# Patient Record
Sex: FEMALE | Race: WHITE | Employment: OTHER | URBAN - NONMETROPOLITAN AREA
[De-identification: names, ages, dates, MRNs, and addresses within clinical notes are randomized per-mention and may not be internally consistent; named-entity substitution may affect disease eponyms.]

---

## 2018-03-12 ENCOUNTER — APPOINTMENT (OUTPATIENT)
Dept: CT IMAGING | Facility: HOSPITAL | Age: 78
DRG: 809 | End: 2018-03-12
Attending: FAMILY MEDICINE
Payer: MEDICARE

## 2018-03-12 ENCOUNTER — HOSPITAL ENCOUNTER (INPATIENT)
Facility: HOSPITAL | Age: 78
LOS: 2 days | Discharge: HOME | DRG: 809 | End: 2018-03-14
Attending: FAMILY MEDICINE | Admitting: INTERNAL MEDICINE
Payer: MEDICARE

## 2018-03-12 DIAGNOSIS — S01.01XA LACERATION OF SCALP, INITIAL ENCOUNTER: ICD-10-CM

## 2018-03-12 DIAGNOSIS — D64.9 ANEMIA: ICD-10-CM

## 2018-03-12 DIAGNOSIS — D50.0 IRON DEFICIENCY ANEMIA DUE TO CHRONIC BLOOD LOSS: ICD-10-CM

## 2018-03-12 DIAGNOSIS — N30.00 ACUTE CYSTITIS WITHOUT HEMATURIA: Primary | ICD-10-CM
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
## 2018-03-12 PROBLEM — F32.A DEPRESSION: Status: ACTIVE | Noted: 2018-03-12

## 2018-03-12 PROBLEM — R55 SYNCOPE, UNSPECIFIED SYNCOPE TYPE: Status: ACTIVE | Noted: 2018-03-12

## 2018-03-12 PROBLEM — E87.6 HYPOKALEMIA: Status: ACTIVE | Noted: 2018-03-12

## 2018-03-12 PROBLEM — D61.818 PANCYTOPENIA (H): Status: ACTIVE | Noted: 2018-03-12

## 2018-03-12 PROBLEM — M19.90 OSTEOARTHRITIS: Status: ACTIVE | Noted: 2018-03-12

## 2018-03-12 LAB
ALBUMIN SERPL-MCNC: 3.6 G/DL (ref 3.4–5)
ALBUMIN UR-MCNC: NEGATIVE MG/DL
ALP SERPL-CCNC: 150 U/L (ref 40–150)
ALT SERPL W P-5'-P-CCNC: 26 U/L (ref 0–50)
ANION GAP SERPL CALCULATED.3IONS-SCNC: 9 MMOL/L (ref 3–14)
APPEARANCE UR: ABNORMAL
AST SERPL W P-5'-P-CCNC: 35 U/L (ref 0–45)
BACTERIA #/AREA URNS HPF: ABNORMAL /HPF
BASOPHILS # BLD AUTO: 0 10E9/L (ref 0–0.2)
BASOPHILS # BLD AUTO: 0 10E9/L (ref 0–0.2)
BASOPHILS NFR BLD AUTO: 0.4 %
BASOPHILS NFR BLD AUTO: 0.5 %
BILIRUB SERPL-MCNC: 0.7 MG/DL (ref 0.2–1.3)
BILIRUB UR QL STRIP: NEGATIVE
BLD PROD TYP BPU: NORMAL
BLD UNIT ID BPU: 0
BLOOD PRODUCT CODE: NORMAL
BPU ID: NORMAL
BUN SERPL-MCNC: 13 MG/DL (ref 7–30)
CALCIUM SERPL-MCNC: 8.2 MG/DL (ref 8.5–10.1)
CHLORIDE SERPL-SCNC: 107 MMOL/L (ref 94–109)
CO2 SERPL-SCNC: 25 MMOL/L (ref 20–32)
COLOR UR AUTO: YELLOW
CREAT SERPL-MCNC: 0.88 MG/DL (ref 0.52–1.04)
DIFFERENTIAL METHOD BLD: ABNORMAL
DIFFERENTIAL METHOD BLD: ABNORMAL
ELLIPTOCYTES BLD QL SMEAR: ABNORMAL
EOSINOPHIL # BLD AUTO: 0 10E9/L (ref 0–0.7)
EOSINOPHIL # BLD AUTO: 0.1 10E9/L (ref 0–0.7)
EOSINOPHIL NFR BLD AUTO: 1.4 %
EOSINOPHIL NFR BLD AUTO: 2.3 %
ERYTHROCYTE [DISTWIDTH] IN BLOOD BY AUTOMATED COUNT: 20 % (ref 10–15)
ERYTHROCYTE [DISTWIDTH] IN BLOOD BY AUTOMATED COUNT: 20.1 % (ref 10–15)
FERRITIN SERPL-MCNC: 5 NG/ML (ref 8–252)
GFR SERPL CREATININE-BSD FRML MDRD: 63 ML/MIN/1.7M2
GLUCOSE SERPL-MCNC: 119 MG/DL (ref 70–99)
GLUCOSE UR STRIP-MCNC: NEGATIVE MG/DL
HCT VFR BLD AUTO: 23.5 % (ref 35–47)
HCT VFR BLD AUTO: 25 % (ref 35–47)
HEMOCCULT SP1 STL QL: NEGATIVE
HGB BLD-MCNC: 6.5 G/DL (ref 11.7–15.7)
HGB BLD-MCNC: 7 G/DL (ref 11.7–15.7)
HGB UR QL STRIP: NEGATIVE
IMM GRANULOCYTES # BLD: 0 10E9/L (ref 0–0.4)
IMM GRANULOCYTES # BLD: 0 10E9/L (ref 0–0.4)
IMM GRANULOCYTES NFR BLD: 0.4 %
IMM GRANULOCYTES NFR BLD: 0.5 %
INR PPP: 1.12 (ref 0.8–1.2)
IRON SATN MFR SERPL: 2 % (ref 15–46)
IRON SERPL-MCNC: 14 UG/DL (ref 35–180)
KETONES UR STRIP-MCNC: NEGATIVE MG/DL
LDH SERPL L TO P-CCNC: 191 U/L (ref 81–234)
LEUKOCYTE ESTERASE UR QL STRIP: ABNORMAL
LYMPHOCYTES # BLD AUTO: 0.7 10E9/L (ref 0.8–5.3)
LYMPHOCYTES # BLD AUTO: 0.8 10E9/L (ref 0.8–5.3)
LYMPHOCYTES NFR BLD AUTO: 31.3 %
LYMPHOCYTES NFR BLD AUTO: 31.8 %
MCH RBC QN AUTO: 17.5 PG (ref 26.5–33)
MCH RBC QN AUTO: 17.7 PG (ref 26.5–33)
MCHC RBC AUTO-ENTMCNC: 27.7 G/DL (ref 31.5–36.5)
MCHC RBC AUTO-ENTMCNC: 28 G/DL (ref 31.5–36.5)
MCV RBC AUTO: 63 FL (ref 78–100)
MCV RBC AUTO: 63 FL (ref 78–100)
MONOCYTES # BLD AUTO: 0.2 10E9/L (ref 0–1.3)
MONOCYTES # BLD AUTO: 0.2 10E9/L (ref 0–1.3)
MONOCYTES NFR BLD AUTO: 6.9 %
MONOCYTES NFR BLD AUTO: 8 %
NEUTROPHILS # BLD AUTO: 1.3 10E9/L (ref 1.6–8.3)
NEUTROPHILS # BLD AUTO: 1.5 10E9/L (ref 1.6–8.3)
NEUTROPHILS NFR BLD AUTO: 57.1 %
NEUTROPHILS NFR BLD AUTO: 59.4 %
NITRATE UR QL: POSITIVE
NRBC # BLD AUTO: 0 10*3/UL
NRBC # BLD AUTO: 0 10*3/UL
NRBC BLD AUTO-RTO: 0 /100
NRBC BLD AUTO-RTO: 0 /100
PH UR STRIP: 7 PH (ref 4.7–8)
PLATELET # BLD AUTO: 110 10E9/L (ref 150–450)
PLATELET # BLD AUTO: 96 10E9/L (ref 150–450)
POTASSIUM SERPL-SCNC: 3.2 MMOL/L (ref 3.4–5.3)
PROT SERPL-MCNC: 7.4 G/DL (ref 6.8–8.8)
RBC # BLD AUTO: 3.71 10E12/L (ref 3.8–5.2)
RBC # BLD AUTO: 3.95 10E12/L (ref 3.8–5.2)
RBC #/AREA URNS AUTO: 1 /HPF (ref 0–2)
RETICS # AUTO: 57.1 10E9/L (ref 25–95)
RETICS/RBC NFR AUTO: 1.5 % (ref 0.5–2)
SODIUM SERPL-SCNC: 141 MMOL/L (ref 133–144)
SOURCE: ABNORMAL
SP GR UR STRIP: 1.01 (ref 1–1.03)
TIBC SERPL-MCNC: 592 UG/DL (ref 240–430)
TRANSFUSION STATUS PATIENT QL: NORMAL
TRANSFUSION STATUS PATIENT QL: NORMAL
TROPONIN I SERPL-MCNC: <0.015 UG/L (ref 0–0.04)
TSH SERPL DL<=0.005 MIU/L-ACNC: 1.91 MU/L (ref 0.4–4)
UROBILINOGEN UR STRIP-MCNC: 2 MG/DL (ref 0–2)
WBC # BLD AUTO: 2.2 10E9/L (ref 4–11)
WBC # BLD AUTO: 2.6 10E9/L (ref 4–11)
WBC #/AREA URNS AUTO: >182 /HPF (ref 0–5)

## 2018-03-12 PROCEDURE — 0HQ0XZZ REPAIR SCALP SKIN, EXTERNAL APPROACH: ICD-10-PCS | Performed by: FAMILY MEDICINE

## 2018-03-12 PROCEDURE — 93005 ELECTROCARDIOGRAM TRACING: CPT

## 2018-03-12 PROCEDURE — 40000847 ZZHCL STATISTIC MORPHOLOGY W/INTERP HISTOLOGY TC 85060: Performed by: INTERNAL MEDICINE

## 2018-03-12 PROCEDURE — 36415 COLL VENOUS BLD VENIPUNCTURE: CPT | Performed by: INTERNAL MEDICINE

## 2018-03-12 PROCEDURE — 84484 ASSAY OF TROPONIN QUANT: CPT | Performed by: FAMILY MEDICINE

## 2018-03-12 PROCEDURE — 86901 BLOOD TYPING SEROLOGIC RH(D): CPT | Performed by: INTERNAL MEDICINE

## 2018-03-12 PROCEDURE — 12001 RPR S/N/AX/GEN/TRNK 2.5CM/<: CPT

## 2018-03-12 PROCEDURE — 30233N1 TRANSFUSION OF NONAUTOLOGOUS RED BLOOD CELLS INTO PERIPHERAL VEIN, PERCUTANEOUS APPROACH: ICD-10-PCS | Performed by: INTERNAL MEDICINE

## 2018-03-12 PROCEDURE — 82728 ASSAY OF FERRITIN: CPT | Performed by: INTERNAL MEDICINE

## 2018-03-12 PROCEDURE — 85025 COMPLETE CBC W/AUTO DIFF WBC: CPT | Performed by: INTERNAL MEDICINE

## 2018-03-12 PROCEDURE — 99223 1ST HOSP IP/OBS HIGH 75: CPT | Performed by: INTERNAL MEDICINE

## 2018-03-12 PROCEDURE — 85610 PROTHROMBIN TIME: CPT | Performed by: FAMILY MEDICINE

## 2018-03-12 PROCEDURE — 86922 COMPATIBILITY TEST ANTIGLOB: CPT | Performed by: INTERNAL MEDICINE

## 2018-03-12 PROCEDURE — 25000128 H RX IP 250 OP 636: Performed by: INTERNAL MEDICINE

## 2018-03-12 PROCEDURE — A9270 NON-COVERED ITEM OR SERVICE: HCPCS | Mod: GY | Performed by: INTERNAL MEDICINE

## 2018-03-12 PROCEDURE — 85045 AUTOMATED RETICULOCYTE COUNT: CPT | Performed by: INTERNAL MEDICINE

## 2018-03-12 PROCEDURE — 12001 RPR S/N/AX/GEN/TRNK 2.5CM/<: CPT | Performed by: FAMILY MEDICINE

## 2018-03-12 PROCEDURE — P9016 RBC LEUKOCYTES REDUCED: HCPCS | Performed by: INTERNAL MEDICINE

## 2018-03-12 PROCEDURE — 74177 CT ABD & PELVIS W/CONTRAST: CPT | Mod: TC

## 2018-03-12 PROCEDURE — 82274 ASSAY TEST FOR BLOOD FECAL: CPT | Performed by: FAMILY MEDICINE

## 2018-03-12 PROCEDURE — 99285 EMERGENCY DEPT VISIT HI MDM: CPT | Mod: 25

## 2018-03-12 PROCEDURE — 83540 ASSAY OF IRON: CPT | Performed by: INTERNAL MEDICINE

## 2018-03-12 PROCEDURE — 84443 ASSAY THYROID STIM HORMONE: CPT | Performed by: INTERNAL MEDICINE

## 2018-03-12 PROCEDURE — 93010 ELECTROCARDIOGRAM REPORT: CPT | Performed by: INTERNAL MEDICINE

## 2018-03-12 PROCEDURE — 82746 ASSAY OF FOLIC ACID SERUM: CPT | Performed by: INTERNAL MEDICINE

## 2018-03-12 PROCEDURE — 40000786 ZZHCL STATISTIC ACTIVE MRSA SURVEILLANCE CULTURE: Performed by: INTERNAL MEDICINE

## 2018-03-12 PROCEDURE — 81001 URINALYSIS AUTO W/SCOPE: CPT | Performed by: INTERNAL MEDICINE

## 2018-03-12 PROCEDURE — 83010 ASSAY OF HAPTOGLOBIN QUANT: CPT | Performed by: INTERNAL MEDICINE

## 2018-03-12 PROCEDURE — 83550 IRON BINDING TEST: CPT | Performed by: INTERNAL MEDICINE

## 2018-03-12 PROCEDURE — 83615 LACTATE (LD) (LDH) ENZYME: CPT | Performed by: INTERNAL MEDICINE

## 2018-03-12 PROCEDURE — 12000000 ZZH R&B MED SURG/OB

## 2018-03-12 PROCEDURE — 80053 COMPREHEN METABOLIC PANEL: CPT | Performed by: FAMILY MEDICINE

## 2018-03-12 PROCEDURE — 86900 BLOOD TYPING SEROLOGIC ABO: CPT | Performed by: INTERNAL MEDICINE

## 2018-03-12 PROCEDURE — 82607 VITAMIN B-12: CPT | Performed by: INTERNAL MEDICINE

## 2018-03-12 PROCEDURE — 70450 CT HEAD/BRAIN W/O DYE: CPT | Mod: TC

## 2018-03-12 PROCEDURE — 99285 EMERGENCY DEPT VISIT HI MDM: CPT | Mod: 25 | Performed by: FAMILY MEDICINE

## 2018-03-12 PROCEDURE — 86850 RBC ANTIBODY SCREEN: CPT | Performed by: INTERNAL MEDICINE

## 2018-03-12 PROCEDURE — 36415 COLL VENOUS BLD VENIPUNCTURE: CPT | Performed by: FAMILY MEDICINE

## 2018-03-12 PROCEDURE — 85025 COMPLETE CBC W/AUTO DIFF WBC: CPT | Performed by: FAMILY MEDICINE

## 2018-03-12 PROCEDURE — 25000132 ZZH RX MED GY IP 250 OP 250 PS 637: Mod: GY | Performed by: INTERNAL MEDICINE

## 2018-03-12 RX ORDER — DOCUSATE SODIUM 100 MG/1
100 CAPSULE, LIQUID FILLED ORAL 2 TIMES DAILY
Status: DISCONTINUED | OUTPATIENT
Start: 2018-03-12 | End: 2018-03-14 | Stop reason: HOSPADM

## 2018-03-12 RX ORDER — POTASSIUM CHLORIDE 1500 MG/1
40 TABLET, EXTENDED RELEASE ORAL 2 TIMES DAILY
Status: DISCONTINUED | OUTPATIENT
Start: 2018-03-12 | End: 2018-03-14 | Stop reason: HOSPADM

## 2018-03-12 RX ORDER — IOPAMIDOL 612 MG/ML
100 INJECTION, SOLUTION INTRAVASCULAR ONCE
Status: COMPLETED | OUTPATIENT
Start: 2018-03-12 | End: 2018-03-12

## 2018-03-12 RX ORDER — BISACODYL 5 MG
10 TABLET, DELAYED RELEASE (ENTERIC COATED) ORAL DAILY PRN
Status: DISCONTINUED | OUTPATIENT
Start: 2018-03-12 | End: 2018-03-14 | Stop reason: HOSPADM

## 2018-03-12 RX ORDER — HYDROCODONE BITARTRATE AND ACETAMINOPHEN 5; 325 MG/1; MG/1
1 TABLET ORAL EVERY 6 HOURS PRN
Status: DISCONTINUED | OUTPATIENT
Start: 2018-03-12 | End: 2018-03-14 | Stop reason: HOSPADM

## 2018-03-12 RX ORDER — BISACODYL 5 MG
5 TABLET, DELAYED RELEASE (ENTERIC COATED) ORAL DAILY PRN
Status: DISCONTINUED | OUTPATIENT
Start: 2018-03-12 | End: 2018-03-14 | Stop reason: HOSPADM

## 2018-03-12 RX ORDER — VENLAFAXINE 75 MG/1
75 TABLET ORAL DAILY
Status: DISCONTINUED | OUTPATIENT
Start: 2018-03-12 | End: 2018-03-13

## 2018-03-12 RX ORDER — METHYLPREDNISOLONE SODIUM SUCCINATE 125 MG/2ML
125 INJECTION, POWDER, LYOPHILIZED, FOR SOLUTION INTRAMUSCULAR; INTRAVENOUS ONCE
Status: DISCONTINUED | OUTPATIENT
Start: 2018-03-12 | End: 2018-03-12

## 2018-03-12 RX ORDER — ONDANSETRON 2 MG/ML
4 INJECTION INTRAMUSCULAR; INTRAVENOUS EVERY 6 HOURS PRN
Status: DISCONTINUED | OUTPATIENT
Start: 2018-03-12 | End: 2018-03-14 | Stop reason: HOSPADM

## 2018-03-12 RX ORDER — BISACODYL 5 MG
15 TABLET, DELAYED RELEASE (ENTERIC COATED) ORAL DAILY PRN
Status: DISCONTINUED | OUTPATIENT
Start: 2018-03-12 | End: 2018-03-14 | Stop reason: HOSPADM

## 2018-03-12 RX ORDER — ONDANSETRON 4 MG/1
4 TABLET, ORALLY DISINTEGRATING ORAL EVERY 6 HOURS PRN
Status: DISCONTINUED | OUTPATIENT
Start: 2018-03-12 | End: 2018-03-14 | Stop reason: HOSPADM

## 2018-03-12 RX ORDER — LEVOTHYROXINE SODIUM 50 UG/1
50 TABLET ORAL DAILY
Status: DISCONTINUED | OUTPATIENT
Start: 2018-03-13 | End: 2018-03-14 | Stop reason: HOSPADM

## 2018-03-12 RX ORDER — SIMVASTATIN 20 MG
20 TABLET ORAL AT BEDTIME
Status: DISCONTINUED | OUTPATIENT
Start: 2018-03-12 | End: 2018-03-14 | Stop reason: HOSPADM

## 2018-03-12 RX ORDER — SODIUM CHLORIDE 9 MG/ML
INJECTION, SOLUTION INTRAVENOUS CONTINUOUS
Status: DISCONTINUED | OUTPATIENT
Start: 2018-03-12 | End: 2018-03-13

## 2018-03-12 RX ORDER — NALOXONE HYDROCHLORIDE 0.4 MG/ML
.1-.4 INJECTION, SOLUTION INTRAMUSCULAR; INTRAVENOUS; SUBCUTANEOUS
Status: DISCONTINUED | OUTPATIENT
Start: 2018-03-12 | End: 2018-03-14 | Stop reason: HOSPADM

## 2018-03-12 RX ADMIN — POTASSIUM CHLORIDE 40 MEQ: 20 TABLET, EXTENDED RELEASE ORAL at 22:31

## 2018-03-12 RX ADMIN — IOPAMIDOL 100 ML: 612 INJECTION, SOLUTION INTRAVENOUS at 21:54

## 2018-03-12 RX ADMIN — SIMVASTATIN 20 MG: 20 TABLET, FILM COATED ORAL at 22:31

## 2018-03-12 RX ADMIN — SODIUM CHLORIDE, PRESERVATIVE FREE: 5 INJECTION INTRAVENOUS at 22:22

## 2018-03-12 RX ADMIN — DIATRIZOATE MEGLUMINE AND DIATRIZOATE SODIUM 30 ML: 660; 100 SOLUTION ORAL; RECTAL at 21:54

## 2018-03-12 RX ADMIN — HYDROCODONE BITARTRATE AND ACETAMINOPHEN 1 TABLET: 5; 325 TABLET ORAL at 22:31

## 2018-03-12 ASSESSMENT — VISUAL ACUITY: OU: BASELINE

## 2018-03-12 NOTE — ED NOTES
"States \"Doctor has seen me a little while ago.  In ED for falling off a step stool and hitting the back of my head, denies neck pain, lost consciousness-does not know for how long. \"   and family at bedside.   "

## 2018-03-12 NOTE — ED PROVIDER NOTES
eMERGENCY dEPARTMENT eNCOUnter        CHIEF COMPLAINT    Chief Complaint   Patient presents with     Head Laceration     Fell from the 2nd step on the step stool with LOC.       HPI    Gina Beal is a 77 year old female who presents with fall.  History is obtained from the patient and from her family.  She said she was cleaning her knickknacks at her house.  She lives there with her  he was upstairs but is very hard of hearing.  She remembers adjusting then the next thing she knew she woke up on the floor there was blood on her hand and on the floor she put her she does not know how long she was unconscious.  She walked upstairs from her  he called their daughter who lives a few houses away who picked them up and they all came into the emergency department.  Hand on the back of her head and realized it was bleeding.  We also went through this several times.  Irina is not sure if she tripped and fell from the stool or exactly what happened.  She says she has been feeling well otherwise although was feeling dizzy today.  Specifically she denies any fevers chills cough chest pain.  Positive for   REVIEW OF SYSTEMS    Neurologic: positive  LOC, No hearing loss  Cardiac: No Chest Pain, positive for syncope  Respiratory: No cough or difficulty breathing  GI: No Bloody Stool or Diarrhea  : No Dysuria or Hematuria  General: No Fever  All other systems reviewed and are negative.    PAST MEDICAL & SURGICAL HISTORY    History reviewed. No pertinent past medical history.  History reviewed. No pertinent surgical history.    CURRENT MEDICATIONS    Current Outpatient Rx   Medication Sig Dispense Refill     Furosemide (LASIX PO) Take 40 mg by mouth daily       UNABLE TO FIND 10 mEq daily Takes Potassium Chloride 10 meq by mouth at night       Omeprazole (PRILOSEC PO) Take 20 mg by mouth 2 times daily (before meals)       Venlafaxine HCl (EFFEXOR PO) Take 75 mg by mouth daily       Levothyroxine Sodium  (SYNTHROID PO) Take 50 mcg by mouth daily        Sertraline HCl (ZOLOFT PO) Take by mouth daily       METFORMIN HCL PO Take 500 mg by mouth daily        HYDROcodone-acetaminophen (NORCO) 5-325 MG per tablet Take 1 tablet by mouth every 6 hours as needed       Simvastatin (ZOCOR PO) Take 20 mg by mouth At Bedtime        HYDROCHLOROTHIAZIDE PO Take by mouth daily       LORAZEPAM PO Take by mouth daily         ALLERGIES    Allergies   Allergen Reactions     Aspirin      Flexeril [Cyclobenzaprine]      Iodine      Sulfa Drugs        SOCIAL & FAMILY HISTORY    Social History     Social History     Marital status:      Spouse name: N/A     Number of children: N/A     Years of education: N/A     Social History Main Topics     Smoking status: Not on file     Smokeless tobacco: Not on file     Alcohol use Not on file     Drug use: Not on file     Sexual activity: Not on file     Other Topics Concern     Not on file     Social History Narrative     No family history on file.    PHYSICAL EXAM    VITAL SIGNS: BP (!) 128/49  Temp 97.7  F (36.5  C) (Tympanic)  Resp 16  Ht 1.524 m (5')  SpO2 97%   Constitutional:  Well developed, well nourished, no acute distress, she is alert pleasant and very talkative.  Eyes: Pupils equally round and react to light, sclera nonicteric  HENT: She has a 2 cm laceration to her left occiput.  No other bruising or injury is noted., no trismus  Neck: supple, no JVD, noposterior neck tenderness  Respiratory:  Lungs Clear, no retractions   Cardiovascular: Regular rate, no murmurs  GI:  Soft, nontender, normal bowel sounds  Musculoskeletal:  No edema, no  Vascular: All pulses are 2+ equal bilaterally  Integument:  Well hydrated, no petechiae   Neurologic:  Alert & oriented, no slurred speech  Psych: Pleasant affect, no hallucinations    EKG    NSR, no ischemic changes    RADIOLOGY  (read by the radiologist)  CT head negative for bleed        ED COURSE & MEDICAL DECISION MAKING    Pertinent  Labs & Imaging studies reviewed and interpreted. (See chart for details)    [facility] Procedure Note        Laceration Repair:    Performed by: Tomasa Coppola  Authorized by: Tomasa Coppola  Consent given by: Patient and Family who states understanding of the procedure being performed after discussing the risks, benefits and alternatives.    Preparation: Patient was prepped and draped in usual sterile fashion.  Irrigation solution: saline    Body area:scalp  Laceration length: 2cm  Contamination: The wound is not contaminated.  Foreign bodies:none  Tendon involvement: none  Anesthesia: Local  Local anesthetic: Bupivacaine 0.5%, Lidocaine     1%  Anesthetic total: 3ml    Debridement: none  Skin closure: Closed with 4 Staples  Technique: interrupted  Approximation: close  Approximation difficulty: simple    Patient tolerance: Patient tolerated the procedure well with no immediate complications.    See chart for details of medications given during the ED stay.    Vitals:    03/12/18 1704   BP: (!) 128/49   Resp: 16   Temp: 97.7  F (36.5  C)   TempSrc: Tympanic   SpO2: 97%   Height: 1.524 m (5')           FINAL IMPRESSION    1. Anemia    2. Fall, initial encounter    3. Laceration of scalp, initial encounter        PLAN   I suspect her fall is due to syncope secondary to anemia.  She will be admitted for transfusion, telemetry, to Dr. Ayala and he is here in the department to see the patient.  Dr. Barahona of trauma surgery was notified at 1905 by phone for consult.         (Please note that this note was completed with a voice recognition program.  Every attempt was made to edit the dictations, but inevitably there remain words that are mis-transcribed.)       Tomasa Coppola MD  03/12/18 1917

## 2018-03-12 NOTE — IP AVS SNAPSHOT
Gina Ortiz #[id number] (CSN: [id number])  (77 year old F)  (Adm: 18)     HIMED-3116 -3116-1               [facility]: [phone]            Patient Demographics     Patient Name Sex          Age SSN Address Phone    Gina Ortiz Female 1940 (77 year old) [id number] [facility] [address] [phone] (Home)      Emergency Contact(s)     Name Relation Home Work Mobile    BARRY ORTIZ Spouse [phone]      KENZIE RICH Daughter [phone]        Admission Information     Attending Provider Admitting Provider Admission Type Admission Date/Time    Aman Phipps MD Urbonas, Arvydas, MD Emergency 18  1711    Discharge Date Hospital Service Auth/Cert Status Service Area     Trauma Incomplete [facility] SERVICES    Unit Room/Bed Admission Status       [facility] 3116 /3116-1 Admission (Confirmed)       Admission     Complaint    Anemia, Symptomatic anemia      Hospital Account     Name Acct ID Class Status Primary Coverage    Gina Ortiz [id number] Inpatient Open MEDICARE - MEDICARE FOR HB SUPPLEMENT            Guarantor Account (for Hospital Account #[id number])     Name Relation to Pt Service Area Active? Acct Type    Gina Ortiz Self [facility] Yes Personal/Family    Address Phone          [address] [place] [address] [phone](H)              Coverage Information (for Hospital Account #[id number])     1. MEDICARE/MEDICARE FOR HB SUPPLEMENT     F/O Payor/Plan Precert #    MEDICARE/MEDICARE FOR HB SUPPLEMENT     Subscriber Subscriber #    Gina Ortiz [id number]    Address Phone    [employer] CLAIMS  [address]  [place] [address] [phone]          2. BCBS/BCBS PLATINUM BLUE     F/O Payor/Plan Precert #    BCBS/BCBS PLATINUM BLUE     Subscriber Subscriber #    Gina Ortiz [id number]    Address Phone    [address] [address]  [place], [place] [id number] [phone]                                                       INTERAGENCY TRANSFER FORM - PHYSICIAN ORDERS   3/12/2018                       [facility]: [phone]            Attending Provider: Aman Phipps MD     Allergies:  Aspirin, Flexeril [Cyclobenzaprine], Iodine, Sulfa Drugs    Infection:  None   Service:  TRAUMA    Ht:  1.524 m (5')   Wt:  63.5 kg (139 lb 15.9 oz)   Admission Wt:  63.5 kg (139 lb 15.9 oz)    BMI:  27.34 kg/m 2   BSA:  1.64 m 2            ED Clinical Impression     Diagnosis Description Comment Added By Time Added    Anemia [D64.9] Anemia [D64.9]  Tomasa Coppola MD 3/12/2018  7:08 PM    Fall, initial encounter [W19.XXXA] Fall, initial encounter [W19.XXXA]  Tomasa Coppola MD 3/12/2018  7:08 PM    Laceration of scalp, initial encounter [S01.01XA] Laceration of scalp, initial encounter [S01.01XA]  Tomasa Coppola MD 3/12/2018  7:08 PM      Hospital Problems as of 3/14/2018              Priority Class Noted POA    Iron deficiency anemia due to chronic blood loss Medium  3/12/2018 Yes    Pancytopenia (H) Medium  3/12/2018 Yes    Hypokalemia Medium  3/12/2018 Yes    Syncope, unspecified syncope type Medium  3/12/2018 Yes    Depression Medium  3/12/2018 Yes    Osteoarthritis Medium  3/12/2018 Yes    Symptomatic anemia Medium  3/12/2018 Yes    Laceration of scalp, initial encounter Medium  3/14/2018 Yes    Fall, initial encounter Medium  3/14/2018 Yes      Non-Hospital Problems as of 3/14/2018     None      Code Status History     Date Active Date Inactive Code Status Order ID Comments User Context    3/14/2018  7:26 AM  Full Code [id number]  Aman Phipps MD Outpatient    3/12/2018  8:48 PM 3/14/2018  7:26 AM Full Code [id number]  Kassy Ayala MD Inpatient      Current Code Status     Date Active Code Status Order ID Comments User Context       Prior         Medication Review      START taking        Dose / Directions Comments    ferrous sulfate 325 (65 FE) MG tablet   Commonly known as:  IRON        Dose:  325 mg   Take 1  tablet (325 mg) by mouth 2 times daily   Quantity:  100 tablet   Refills:  0        levofloxacin 250 MG tablet   Commonly known as:  LEVAQUIN   Indication:  Urinary Tract Infection   Used for:  Acute cystitis without hematuria        Dose:  250 mg   Take 1 tablet (250 mg) by mouth daily for 2 days   Quantity:  2 tablet   Refills:  0          CONTINUE these medications which have NOT CHANGED        Dose / Directions Comments    HYDROcodone-acetaminophen 5-325 MG per tablet   Commonly known as:  NORCO        Dose:  1 tablet   Take 1 tablet by mouth every 6 hours as needed   Refills:  0        LASIX PO        Dose:  40 mg   Take 40 mg by mouth daily   Refills:  0        LORAZEPAM PO        Take by mouth daily   Refills:  0        METFORMIN HCL PO        Dose:  500 mg   Take 500 mg by mouth daily   Refills:  0        potassium chloride sveta er   Commonly known as:  K-DUR   Indication:  Low Amount of Potassium in the Blood        Dose:  10 mEq   Take 10 mEq by mouth once   Refills:  0        PRILOSEC PO        Dose:  20 mg   Take 20 mg by mouth 2 times daily (before meals)   Refills:  0        SYNTHROID PO        Dose:  50 mcg   Take 50 mcg by mouth daily   Refills:  0        venlafaxine 75 MG 24 hr capsule   Commonly known as:  EFFEXOR-XR        Dose:  75 mg   Take 75 mg by mouth daily   Refills:  0        ZOCOR PO        Dose:  20 mg   Take 20 mg by mouth At Bedtime   Refills:  0          STOP taking     HYDROCHLOROTHIAZIDE PO                   After Care     Activity       Your activity upon discharge: activity as tolerated       Diet       Follow this diet upon discharge: Orders Placed This Encounter      Regular Diet Adult               Further instructions from your care team       What to expect when you have contrast    During your exam, we will inject  contrast  into your vein or artery. (Contrast is a clear liquid with iodine in it. It shows up on X-rays.)    You may feel warm or hot. You may have a metal taste  in your mouth and a slight upset stomach. You may also feel pressure near the kidneys and bladder. These effects will last about 1 to 3 minutes.    Please tell us if you have:    Sneezing     Itching    Hives     Swelling in the face    A hoarse voice    Breathing problems    Other new symptoms    Serious problems are rare.  They may include:    Irregular heartbeat     Seizures    Kidney failure              Tissue damage    Shock      Death    If you have any problems during the exam, we  will treat them right away.    When you get home    Call your hospital if you have any new symptoms in the next 2 days, like hives or swelling. (Phone numbers are at the bottom of this page.) Or call your family doctor.     If you have wheezing or trouble breathing, call 911.    Self-care  -Drink at least 4 extra glasses of water today.   This reduces the stress on your kidneys.  -Keep taking your regular medicines.    The contrast will pass out of your body in your  Urine(pee). This will happen in the next 24 hours. You  will not feel this. Your urine will not  change color.    If you have kidney problems or take metformin    Drink 4 to 8 large glasses of water for the next  2 days, if you are not on a fluid restriction.    ?If you take metformin (Glucophage or Glucovance) for diabetes, keep taking it.      ?Your kidney function tests are abnormal.  If you take Metformin, do not take it for 48 hours. Please go to your clinic for a blood test within 3 days after your exam before the restarting this medicine.     (Note to provider:please give patient prescription for lab tests.)    ?Special instructions:     I have read and understand the above information.    Patient Sign Here:______________________________________Date:________Time:______    Staff Sign Here:________________________________________Date:_______Time:______      Radiology Departments:     ?[facility]: [phone] ?[facility]: [phone]     ?[facility]:  [phone] ?[facility]:[phone]      ?[facility]: [phone]  ?[facility]: [phone]  ?[facility]:[phone]    ?[facility]:[phone]  ?[facility]:[phone]      You have a follow up appointment scheduled with Dr. Amaro on Tuesday March 20, 2018 at 11:30am at the [facility]. You will need to set up an appointment through Dr. Amaro at this appointment for an endoscopy/ colonoscopy, have your staples removed and have a lab appointment to recheck your CBC.If you have any questions regarding the appointment please call the clinic at [phone].    You have an appointment scheduled with Dr. Lott on Thursday March 22, 2018 at 8:30am at the [address]. The address to this clinic is [address] [place] [address] and the telephone number is [phone] if you have any questions regarding this appointment.      Follow-Up Appointment Instructions     Follow-up and recommended labs and tests        Follow up with primary care provider, Garrett Amaro, within 7 days for hospital follow- up.  The following labs/tests are recommended: CBC.  Mammogram.  Needs to have Endoscopy and Colonoscopy set up as outpatient also through Dr Amaro.  Can have with Dr Barahona who saw patient here in hospital or with the Surgeon/GI Doc of their choice.    Needs to have staples taken out at appointment with Dr Amaro also from scalp laceration.    Please make appointment with Dr Lott Oncology  either in [place] or [place] for diagnosis of pancytopenia.  Please try and get for the next 2 weeks.             Statement of Approval     Ordered          03/14/18 0726  I have reviewed and agree with all the recommendations and orders detailed in this document.  EFFECTIVE NOW     Approved and electronically signed by:  Aman Phipps MD                                                 INTERAGENCY TRANSFER FORM - NURSING   3/12/2018                       [facility]:  [phone]            Attending Provider: Aman Phipps MD     Allergies:  Aspirin, Flexeril [Cyclobenzaprine], Iodine, Sulfa Drugs    Infection:  None   Service:  TRAUMA    Ht:  1.524 m (5')   Wt:  63.5 kg (139 lb 15.9 oz)   Admission Wt:  63.5 kg (139 lb 15.9 oz)    BMI:  27.34 kg/m 2   BSA:  1.64 m 2            Advance Directives        Scanned docmt in ACP Activity?           No scanned doc        Immunizations     None      ASSESSMENT     Discharge Profile Flowsheet     DISCHARGE NEEDS ASSESSMENT     Patient's communication style  spoken language (English or Bilingual) 03/12/18 1709    Patient/family verbalizes understanding of discharge plan recommendations?  Yes 03/13/18 1054   SKIN      Medical Team notified of plan?  yes 03/13/18 1054   Inspection of bony prominences  Full 03/14/18 0030    Readmission Within The Last 30 Days  no previous admission in last 30 days 03/13/18 1054   Full except areas not inspected   Buttock, left;Buttock, right;Sacrum;Coccyx 03/13/18 0901    Anticipated Changes Related to Illness  none 03/13/18 1054   Inspection under devices  Full 03/14/18 0030    Transportation Available  family or friend will provide 03/13/18 1054   Skin WDL  ex;characteristics 03/14/18 0030    ASSESSMENT OF FUNCTIONAL STATUS     Skin Temperature  warm 03/14/18 0030    Prior to admission patient needed assistance with:  -- (No needs) 03/13/18 1054   Skin Moisture  dry 03/14/18 0030    GASTROINTESTINAL (ADULT,PEDIATRIC,OB)     Skin Elasticity  quick return to original state 03/13/18 1741    GI WDL  WDL 03/14/18 0030   Skin Integrity  wound(s) (back of head. ) 03/14/18 0030    Last Bowel Movement  03/13/18 03/13/18 1741   SAFETY      Passing flatus  yes 03/14/18 0030   Safety WDL  WDL 03/14/18 0030    COMMUNICATION ASSESSMENT     All Alarms  alarm(s) activated and audible 03/14/18 0030                 Assessment WDL (Within Defined Limits) Definitions           Safety WDL     Effective: 09/28/15    Row  "Information: <b>WDL Definition:</b> Bed in low position, wheels locked; call light in reach; upper side rails up x 2; ID band on<br> <font color=\"gray\"><i>Item=AS safety wdl>>List=AS safety wdl>>Version=F14</i></font>      Skin WDL     Effective: 09/28/15    Row Information: <b>WDL Definition:</b> Warm; dry; intact; elastic; without discoloration; pressure points without redness<br> <font color=\"gray\"><i>Item=AS skin wdl>>List=AS skin wdl>>Version=F14</i></font>      Vitals     Vital Signs Flowsheet     VITAL SIGNS     Side Effects Monitoring: Respiratory Depth  N 03/13/18 0815    Temp  98.1  F (36.7  C) 03/14/18 0751   Side Effects Monitoring: Sedation Level  1 03/13/18 0815    Temp src  Tympanic 03/14/18 0751   HEIGHT AND WEIGHT      Resp  16 03/14/18 0751   Height  1.524 m (5') 03/12/18 2025    Pulse  71 03/12/18 2329   Height Method  Stated 03/12/18 2025    Heart Rate  67 03/14/18 0751   Weight  63.5 kg (139 lb 15.9 oz) 03/12/18 2025    Pulse/Heart Rate Source  Monitor 03/14/18 0751   Weight Method  Standing scale 03/12/18 2025    BP  145/61 03/14/18 0751   Estimated Weight (From ED)  63.5 kg (140 lb) 03/12/18 1708    OXYGEN THERAPY     BSA (Calculated - sq m)  1.64 03/12/18 2025    SpO2  92 % 03/14/18 0751   BMI (Calculated)  27.4 03/12/18 2025    O2 Device  None (Room air) 03/14/18 0751   ENRIQUETA COMA SCALE      PAIN/COMFORT     Best Eye Response  4-->(E4) spontaneous 03/12/18 2350    Patient Currently in Pain  yes 03/14/18 0743   Best Motor Response  6-->(M6) obeys commands 03/12/18 2350    Preferred Pain Scale  number (Numeric Rating Pain Scale) 03/14/18 0751   Best Verbal Response  5-->(V5) oriented 03/12/18 2350    Patient's Stated Pain Goal  2 03/14/18 0743   Glen Coma Scale Score  15 03/12/18 2350    0-10 Pain Scale  2 03/14/18 0743   POSITIONING      Pain Location  Head 03/14/18 0550   Body Position  independently positioning 03/14/18 0030    Pain Orientation  Posterior 03/14/18 0550   Head of Bed " (HOB)  HOB at 45 degrees 03/13/18 1734    Pain Descriptors  Dull 03/14/18 0550   Positioning/Transfer Devices  pillows;in use 03/13/18 0815    Pain Intervention(s)  Medication (See eMAR) 03/14/18 0550   DAILY CARE      Response to Interventions  Decrease in pain 03/14/18 0751   Activity Management  ambulated to bathroom 03/14/18 0030    ANALGESIA SIDE EFFECTS MONITORING     Activity Assistance Provided  assistance, stand-by 03/14/18 0030    Side Effects Monitoring: Respiratory Quality  R 03/13/18 0815                 Patient Lines/Drains/Airways Status    Active LINES/DRAINS/AIRWAYS     None            Patient Lines/Drains/Airways Status    Active PICC/CVC     None            Intake/Output Detail Report     Date Intake       Output Net    Shift P.O. I.V. IV Piggyback Blood Components Total Urine Total       Noc 03/12/18 2300 - 03/13/18 0659 -- -- -- 942.5 942.5 300 300 642.5    Day 03/13/18 0700 - 03/13/18 1459 960 260 -- -- 1220 550 550 670    Richelle 03/13/18 1500 - 03/13/18 2259 -- -- -- -- -- 575 575 -575    Noc 03/13/18 2300 - 03/14/18 0659 -- -- -- -- -- 900 900 -900    Day 03/14/18 0700 - 03/14/18 1459 -- -- -- -- -- -- -- 0      Last Void/BM       Most Recent Value    Urine Occurrence 1 at 03/13/2018 1400    Stool Occurrence 1 at 03/13/2018 1400      Case Management/Discharge Planning     Case Management/Discharge Planning Flowsheet     REFERRAL INFORMATION     Prior to admission patient needed assistance with:  -- (No needs) 03/13/18 1054    Admission Type  inpatient 03/13/18 1054   EMPLOYMENT      Arrived From  home or self-care 03/13/18 1054   Do you work full or part-time?  no (retired) 03/13/18 1054    Referral Source  admission list 03/13/18 1054   COPING/STRESS      New Steerage to  Clinics?  No 03/13/18 1054   Major Change/Loss/Stressor  family/significant other illness (recent cancer dx for ) 03/13/18 1054    Reason For Consult  discharge planning 03/13/18 2202   Sources Of Support  adult  child(haley);other family members;sibling(s) 03/13/18 1054    Record Reviewed  history and physical;medical record;patient profile 03/13/18 1054   Reaction To Health Status  adjusting 03/13/18 1054    CTS Assigned to Case  -- (Ada Graham RN) 03/13/18 1054   Understanding Of Condition And Treatment  adequate understanding of medical condition 03/13/18 1054    Primary Care Clinic Name  -- ([facility]) 03/13/18 1054   DISCHARGE PLANNING      Primary Care MD Name  -- (Dr. Amaro) 03/13/18 1054   Patient/family verbalizes understanding of discharge plan recommendations?  Yes 03/13/18 1054    LIVING ENVIRONMENT     Medical Team notified of plan?  yes 03/13/18 1054    Lives With  spouse 03/13/18 1054   Readmission Within The Last 30 Days  no previous admission in last 30 days 03/13/18 1054    Living Arrangements  house 03/13/18 1054   Anticipated Changes Related to Illness  none 03/13/18 1054    Provides Primary Care For  no one 03/13/18 1054   Transportation Available  family or friend will provide 03/13/18 1054    Quality Of Family Relationships  supportive;involved 03/13/18 1054   FINAL NOTE      Able to Return to Prior Living Arrangements  yes 03/13/18 1054   Final Note  -- (Return home . No needs assessed.) 03/13/18 1054    HOME SAFETY     ABUSE RISK SCREEN      Patient Feels Safe Living in Home?  yes 03/13/18 1054   QUESTION TO PATIENT:  Has a member of your family or a partner(now or in the past) intimidated, hurt, manipulated, or controlled you in any way?  no 03/12/18 1807    ASSESSMENT OF FAMILY/SOCIAL SUPPORT     QUESTION TO PATIENT: Do you feel safe going back to the place where you are living?  yes 03/12/18 1807    Marital Status   03/13/18 1054   OBSERVATION: Is there reason to believe there has been maltreatment of a vulnerable adult (ie. Physical/Sexual/Emotional abuse, self neglect, lack of adequate food, shelter, medical care, or financial exploitation)?  no 03/12/18 1807    Who is  your support system?  ;Children;Sibling(s) 03/13/18 1054   OTHER      Spouse's Name  -- (Simon) 03/13/18 1054   Are you depressed or being treated for depression?  No 03/12/18 2040    Description of Support System  Supportive;Involved 03/13/18 1054   HOMICIDE RISK      Quality of Family Relationships  supportive;involved 03/13/18 1054   Feels Like Hurting Others  no 03/12/18 1807    ASSESSMENT OF FUNCTIONAL STATUS                         [facility]: [phone]            Medication Administration Report for Gina Beal as of 03/14/18 0842   Legend:    Given Hold Not Given Due Canceled Entry Other Actions    Time Time (Time) Time  Time-Action       Inactive    Active    Linked        Medications 03/08/18 03/09/18 03/10/18 03/11/18 03/12/18 03/13/18 03/14/18    bisacodyl (DULCOLAX) EC tablet 5 mg  Dose: 5 mg  Freq: DAILY PRN Route: PO  PRN Reason: constipation  Start: 03/12/18 2047   Admin Instructions: Do not crush or chew. Swallow whole. May titrate 1 tablet each day until response. Maximum of 3 tablets daily. Hold for loose stools. This is the first step of a three step constipation treatment.              Or  bisacodyl (DULCOLAX) EC tablet 10 mg  Dose: 10 mg  Freq: DAILY PRN Route: PO  PRN Reason: constipation  Start: 03/12/18 2047   Admin Instructions: Do not crush or chew. Swallow whole. May titrate 1 tablet each day until response. Maximum of 3 tablets daily. Hold for loose stools. This is the first step of a three step constipation treatment.              Or  bisacodyl (DULCOLAX) EC tablet 15 mg  Dose: 15 mg  Freq: DAILY PRN Route: PO  PRN Reason: constipation  Start: 03/12/18 2047   Admin Instructions: Do not crush or chew. Swallow whole. May titrate 1 tablet each day until response. Maximum of 3 tablets daily. Hold for loose stools. This is the first step of a three step constipation treatment.               diatrizoate meglumine-sodium (GASTROGRAFIN; GASTROVIEW) 66-10 % solution 30  mL  Dose: 30 mL  Freq: ONCE Route: PO  Start: 03/12/18 1958   Admin Instructions: DOES NOT GO TO MAR.  Supplied and administered by Radiology. MUST DILUTE before giving. For every 30 mL of Gastroview, dilute with 32 oz of Breeza. Patient drinks 8 oz every 10 minutes. Consult the Radiologist on dosing strength for Pediatric and young adults.                      docusate sodium (COLACE) capsule 100 mg  Dose: 100 mg  Freq: 2 TIMES DAILY Route: PO  Start: 03/12/18 2100   Admin Instructions: To prevent constipation.  Hold for loose stools.         (2215)-Not Given        1000 (100 mg)-Given       (2121)-Not Given        0757 (100 mg)-Given              [ ] 2100           ferrous sulfate (IRON) tablet 325 mg  Dose: 325 mg  Freq: 2 TIMES DAILY Route: PO  Start: 03/14/18 0800   End: 04/13/18 0759   Admin Instructions: Absorbed best on an empty stomach. If stomach upset occurs, can take with meals.<br><br>Must be taken 6 hours before or 2 hours after oral levofloxacin.           0757 (325 mg)-Given       [ ] 1200           HYDROcodone-acetaminophen (NORCO) 5-325 MG per tablet 1 tablet  Dose: 1 tablet  Freq: EVERY 6 HOURS PRN Route: PO  PRN Reason: moderate to severe pain  Start: 03/12/18 2042   Admin Instructions: Maximum acetaminophen dose from all sources= 75 mg/kg/day not to exceed 4 grams         2231 (1 tablet)-Given        0644 (1 tablet)-Given       2114 (1 tablet)-Given        0548 (1 tablet)-Given           levofloxacin (LEVAQUIN) tablet 250 mg  Dose: 250 mg  Freq: DAILY Route: PO  Indications of Use: URINARY TRACT INFECTION  Start: 03/13/18 1930   End: 03/15/18 2059   Admin Instructions: Administer at least 2 hrs before or 4 hrs after aluminum, calcium, iron, zinc or magnesium containing products.          2120 (250 mg)-Given        [ ] 2100           levothyroxine (SYNTHROID/LEVOTHROID) tablet 50 mcg  Dose: 50 mcg  Freq: DAILY Route: PO  Start: 03/13/18 0630         0644 (50 mcg)-Given        0548 (50  mcg)-Given           naloxone (NARCAN) injection 0.1-0.4 mg  Dose: 0.1-0.4 mg  Freq: EVERY 2 MIN PRN Route: IV  PRN Reason: opioid reversal  Start: 03/12/18 2043   Admin Instructions: For respiratory rate LESS than or EQUAL to 8.  Partial reversal dose:  0.1 mg titrated q 2 minutes for Analgesia Side Effects Monitoring Sedation Level of 3 (frequently drowsy, arousable, drifts to sleep during conversation).Full reversal dose:  0.4 mg bolus for Analgesia Side Effects Monitoring Sedation Level of 4 (somnolent, minimal or no response to stimulation).  For ordered doses up to 2mg give IVP. Give each 0.4mg over 15 seconds in emergency situations. For non-emergent situations further dilute in 9mL of NS to facilitate titration of response.               omeprazole (priLOSEC) CR capsule 20 mg  Dose: 20 mg  Freq: 2 TIMES DAILY BEFORE MEALS Route: PO  Start: 03/13/18 0700         0644 (20 mg)-Given       1640 (20 mg)-Given        0548 (20 mg)-Given              [ ] 1630           ondansetron (ZOFRAN-ODT) ODT tab 4 mg  Dose: 4 mg  Freq: EVERY 6 HOURS PRN Route: PO  PRN Reasons: nausea,vomiting  Start: 03/12/18 2047   Admin Instructions: This is Step 1 of nausea and vomiting management.  If nausea not resolved in 15 minutes, go to Step 2 prochlorperazine (COMPAZINE). Do not push through foil backing. Peel back foil and gently remove. Place on tongue immediately. Administration with liquid unnecessary  With dry hands, peel back foil backing and gently remove tablet; do not push oral disintegrating tablet through foil backing; administer immediately on tongue and oral disintegrating tablet dissolves in seconds; then swallow with saliva; liquid not required.              Or  ondansetron (ZOFRAN) injection 4 mg  Dose: 4 mg  Freq: EVERY 6 HOURS PRN Route: IV  PRN Reasons: nausea,vomiting  Start: 03/12/18 2047   Admin Instructions: This is Step 1 of nausea and vomiting management.  If nausea not resolved in 15 minutes, go to Step 2  prochlorperazine (COMPAZINE).  Irritant. For ordered doses up to 4 mg, give IV Push undiluted over 2-5 minutes.               potassium chloride SA (K-DUR/KLOR-CON M) CR tablet 40 mEq  Dose: 40 mEq  Freq: 2 TIMES DAILY Route: PO  Start: 03/12/18 2115   End: 03/15/18 2059   Admin Instructions: DO NOT CRUSH         2231 (40 mEq)-Given        1000 (40 mEq)-Given       2114 (40 mEq)-Given        0758 (40 mEq)-Given              [ ] 2100           simvastatin (ZOCOR) tablet 20 mg  Dose: 20 mg  Freq: AT BEDTIME Route: PO  Start: 03/12/18 2200        2231 (20 mg)-Given        2114 (20 mg)-Given        [ ] 2200           venlafaxine (EFFEXOR-XR) 24 hr capsule 75 mg  Dose: 75 mg  Freq: DAILY WITH BREAKFAST Route: PO  Start: 03/13/18 0930   Admin Instructions: DO NOT CRUSH.          1000 (75 mg)-Given        0757 (75 mg)-Given          Completed Medications  Medications 03/08/18 03/09/18 03/10/18 03/11/18 03/12/18 03/13/18 03/14/18         Dose: 30 mL  Freq: ONCE Route: PO  Start: 03/12/18 2145   End: 03/12/18 2154   Admin Instructions: Supplied and administered by Radiology. MUST DILUTE before giving.  For every 5 mL of Gastroview, dilute with 8 oz water or non-pulp juice.         2154 (30 mL)-Given               Dose: 100 mL  Freq: ONCE Route: IV  Start: 03/12/18 2145   End: 03/12/18 2154   Admin Instructions: Supplied and administered by Radiology.         2154 (100 mL)-Given               Dose: 60 mL  Freq: ONCE Route: IV  Start: 03/12/18 2145   End: 03/12/18 2151        2151 (60 mL)-Given [C]            Discontinued Medications  Medications 03/08/18 03/09/18 03/10/18 03/11/18 03/12/18 03/13/18 03/14/18         Dose: 1 g  Freq: EVERY 24 HOURS Route: IV  Indications of Use: URINARY TRACT INFECTION  Start: 03/13/18 0630   End: 03/13/18 1924         0805 (1 g)-New Bag       1924-Med Discontinued          Dose: 0.5 mg  Freq: EVERY 15 MIN PRN Route: IV  PRN Reason: moderate to severe pain  Start: 03/12/18 1730   End: 03/12/18  1730        1730-Med Discontinued           Dose: 125 mg  Freq: ONCE Route: IV  Start: 03/12/18 1731   End: 03/12/18 1730   Admin Instructions: Give Doses 125mg and less IV Push over 2-3 minutes - reconstitute with 2 mL of bacteriostatic water if no diluent is provided. Doses greater than 125 mg need to be in at least 50 mL IVPB.         1730-Med Discontinued           Dose: 10 mEq  Freq: ONCE Route: PO  Indications of Use: HYPOKALEMIA  Start: 03/12/18 2100   End: 03/12/18 2103 2103-Med Discontinued                 Rate: 75 mL/hr   Freq: CONTINUOUS Route: IV  Start: 03/12/18 2100   End: 03/13/18 1155        2222 ( )-New Bag        0701 ( )-Restarted       0810 ( )-Rate/Dose Verify       1155-Med Discontinued          Dose: 75 mg  Freq: DAILY Route: PO  Start: 03/12/18 2100   End: 03/13/18 0917         (0351)-Not Given              0917-Med Discontinued     Medications 03/08/18 03/09/18 03/10/18 03/11/18 03/12/18 03/13/18 03/14/18               INTERAGENCY TRANSFER FORM - NOTES (H&P, Discharge Summary, Consults, Procedures, Therapies)   3/12/2018                       [facility]: [phone]               History & Physicals      H&P by Kassy Ayala MD at 3/12/2018  8:48 PM     Author:  Kassy Ayala MD Service:  Hospitalist Author Type:  Physician    Filed:  3/12/2018  9:34 PM Date of Service:  3/12/2018  8:48 PM Creation Time:  3/12/2018  8:48 PM    Status:  Signed :  Kassy Ayala MD (Physician)         [facility]    History and Physical  Hospitalist       Date of Admission:  3/12/2018    Assessment & Plan   Gina Beal is a 77 year old female who presents with syncope, fall, scalp laceration and found with pancytopenia. Admitted for transfusion, syncope follow up.    Active Problems:   Pancytopenia    Assessment: this is part pancytopenia. She denies and -ve on exam blood loosing features, except RLQ pain with some obstipation    Plan: Will as pathology  peripheral smear to look for dysplastic features (pseudo Pelger-Huet anomaly or neutrophyl hypersegmentation. May need cytogenetic studies or bone marrow but this will be decided by heamologist    Symptomatic Anemia, most likely the cause of syncope  See above.   R/O GI blood loss. CT will be done    Syncope:  Assessment; most likely mechanical fall due to anemia. I could not get abn exam or worrisome feature on ROS.   Will keep her on tele and will check orthostatic VS      Hypokalemia    Assessment: most likely due to loop diuretics and HCTZ she is taking.     Plan: will replace. Will hold diuretics for now. Will rec not to restart HCTZ on Discharge    Depression    Assessment: due to family dynamics. Admits being depressed.     Plan: Continue antidepressants.       Osteoarthritis    Assessment: mild-moderate, but she is on norco    Plan: will continue norco    Scalp laceration  - sutured in ED  - per admission protocol, trauma surgeon will be notified in AM (dr. Gaines is on call)  # Pain Assessment: pt takes norco for her chronic pain and this will be continued  Current Pain Score 3/12/2018 3/12/2018 3/12/2018   Patient currently in pain? denies - yes   Pain score (0-10) 0 2 6   Pain location - - Head   Pain descriptors - - Sore   - Gina is experiencing pain due to chronic pain due to osteoarthritis. Pain management was discussed and the plan was created in a collaborative fashion.  Gina's response to the current recommendations: engaged  compliant  - Opioid regimen: norco home dose  - Response to opioid medications: Reduction of symptoms of pain  - Bowel regimen: senna    DVT Prophylaxis: Pneumatic Compression Devices  Code Status: Full Code    Disposition: Expected discharge in 1-2 stable (activing bleeding r/o and anemia controlled.    [facility]  Hospitalist in [place].     Primary Care Physician   Garrett Amaro    Chief Complaint   Fall, scalp laceration    RFA: fall, anemia requiring  "transfusion.     History is obtained from the patient, electronic health record, emergency department physician, patient's daughter and patient's spouse    History of Present Illness   Gina Beal is a 77 year old women with PMH of HTN,[AU1.1] Osteoarthritis, UTI, depression, was brought to ED after falling at home. She fell from the 2nd step on the step stool. Did not feel any chest pain, palpitation, dizziness immediately before fall, but did not fell \"right\" all day or even a few days prior to admission. At the time of falling, she was cleaning her knickknacks and cleaning them. She denies heal overextending or working with elevated hands. The next thing she remembers- waking on the floor and blood all over. She denies tongue biting or being confused. She does not know how long she was down. She admits intermittent abd pain (RLQ), denies stool color changes, dysuria, hematuria, hematochezia, taking large doses of NSAIDS. Occasionally she gets constipated. Her appetite is not good but she was never a \"big eater\". The rest ROS is -ve. 14 points obtained.[AU1.2]     Past Medical History    I have reviewed this patient's medical history and updated it with pertinent information if needed.   History reviewed. No pertinent past medical history.    Past Surgical History   I have reviewed this patient's surgical history and updated it with pertinent information if needed.  History reviewed. No pertinent surgical history.    Prior to Admission Medications   Prior to Admission Medications   Prescriptions Last Dose Informant Patient Reported? Taking?   Furosemide (LASIX PO) 3/12/2018 at 0800  Yes Yes   Sig: Take 40 mg by mouth daily   HYDROCHLOROTHIAZIDE PO Unknown at Unknown time  Yes No   Sig: Take by mouth daily   HYDROcodone-acetaminophen (NORCO) 5-325 MG per tablet 3/12/2018 at 0800  Yes Yes   Sig: Take 1 tablet by mouth every 6 hours as needed   LORAZEPAM PO Unknown at Unknown time  Yes No   Sig: Take by mouth " daily   Levothyroxine Sodium (SYNTHROID PO) 3/12/2018 at 0700  Yes Yes   Sig: Take 50 mcg by mouth daily    METFORMIN HCL PO 3/12/2018 at 0800  Yes Yes   Sig: Take 500 mg by mouth daily    Omeprazole (PRILOSEC PO) 3/12/2018 at 0700  Yes Yes   Sig: Take 20 mg by mouth 2 times daily (before meals)   Simvastatin (ZOCOR PO) 3/11/2018 at 1900  Yes Yes   Sig: Take 20 mg by mouth At Bedtime    Venlafaxine HCl (EFFEXOR PO) 3/12/2018 at 0800  Yes Yes   Sig: Take 75 mg by mouth daily   potassium chloride sveta er (K-DUR) 3/11/2018 at 1900  Yes Yes   Sig: Take 10 mEq by mouth once      Facility-Administered Medications: None     Allergies   Allergies   Allergen Reactions     Aspirin      Flexeril [Cyclobenzaprine]      Iodine      Sulfa Drugs        Social History   I have reviewed this patient's social history and updated it with pertinent information if needed. Gina Beal[AU1.1]     She admits being a smoker and quit 12 years ago[AU1.2]    Family History   I have reviewed this patient's family history and updated it with pertinent information if needed.   No family history on file.    Review of Systems[AU1.1]   14 points obtained. Pertinent +ves and -ves included in HPI. The rest is -ve.[AU1.2]     Physical Exam   Temp: 98  F (36.7  C) Temp src: Tympanic BP: 165/57 Pulse: 76 Heart Rate: 86 Resp: 16 SpO2: 96 % O2 Device: None (Room air)    Vital Signs with Ranges  Temp:  [97.7  F (36.5  C)-98  F (36.7  C)] 98  F (36.7  C)  Pulse:  [72-76] 76  Heart Rate:  [86] 86  Resp:  [16-18] 16  BP: (128-165)/(49-63) 165/57  SpO2:  [96 %-97 %] 96 %  139 lbs 15.87 oz[AU1.1]    Physical exam:   General: not in distress pleasant woment  Scalp lacerated. conjuntivae pale  Neck:no JVD, no bruits  Card: regular, no M/G/R. PMI not displaced  Lungs: clear bilaterally  Abd; soft, non-distended, not tender, no HSM, no pulsatile masses.   : bladder not palpable.   Neurological: non-focal motor and sensory. Delayed relaxation phase of DTR.    Skin: warm, dry, no rash  Psychiatric: A&O x 4, pleasant.[AU1.2]     Data   Data reviewed today:  I personally reviewed[AU1.1] the EKG tracing showing NSR and the XR and CT - no acute pathology image(s) showing no acute pathology[AU1.2].    Recent Labs  Lab 03/12/18  1744   WBC 2.6*   HGB 7.0*   MCV 63*   *   INR 1.12      POTASSIUM 3.2*   CHLORIDE 107   CO2 25   BUN 13   CR 0.88   ANIONGAP 9   CIPRIANO 8.2*   *   ALBUMIN 3.6   PROTTOTAL 7.4   BILITOTAL 0.7   ALKPHOS 150   ALT 26   AST 35   TROPI <0.015       No results found for this or any previous visit (from the past 24 hour(s)).[AU1.1]       Revision History        User Key Date/Time User Provider Type Action    > AU1.2 3/12/2018  9:34 PM Kassy Ayala MD Physician Sign     AU1.1 3/12/2018  8:48 PM Kassy Ayala MD Physician                   Discharge Summaries     No notes of this type exist for this encounter.         Consult Notes      Consults by Se Barahona MD at 3/13/2018  8:04 AM     Author:  Se Barahona MD Service:  Surgery Author Type:  Physician    Filed:  3/13/2018  2:18 PM Date of Service:  3/13/2018  8:04 AM Creation Time:  3/13/2018  8:04 AM    Status:  Signed :  Se Barahona MD (Physician)     Consult Orders:    1. Surgery General IP Consult: Patient to be seen: Routine - within 24 hours; fall, syncope, scalp laceration, trauma; Consultant may enter orders: Yes [[id number]] ordered by Kassy Ayala MD at 03/12/18 2048                [facility]    History and Physical / Consult note: Trauma Service     Date of Admission:  3/12/2018    Time of Admission/Consult Request (page/call): [phone]    Time of my evaluation: 0715  3/13/3018  Consulting services: None[DL1.1]    Assessment & Plan[DL1.2]   Trauma mechanism: Same level fall with loss of conciousness  Time/date of injury: 3/12/2018 arrival to ER at 1734 pm  Known Injuries:  1. Cephalohematoma to posterior occiput and  laceration  Other diagnoses:   1. Spl[DL1.1]e[DL1.3]nomegaly  2. Anemia of unknown origin  3. Left breast mass 3 cm     Plan:  1. Recommend continued workup for anemia.   2. Will need evaluation for new breast mass with mammogram and possible biopsy      General Cares:    DVT Prophylaxis:[DL1.1] SCDs[DL1.3]  Date of last stool/Bowel Regimen:[DL1.1] Docusate[DL1.3]  Pulmonary toilet:[DL1.1] Incentive spirometry[DL1.3]    ETOH: This patient was asked if in the last 3-6 months there has been a time when[DL1.1] she had 4 or more drinks in a single day/outing.[DL1.3]. Patient answer to the screening question was[DL1.1] in the negative. No intervention needed.[DL1.3]  Primary Care Physician   Garrett Amaro    Chief Complaint[DL1.2]   Same level fall with loss of conciousness    History is obtained from the patient[DL1.3]    History of Present Illness[DL1.2]   Gina Beal is a 77 year old female who presents with[DL1.1] loss of conciousness and a same level fall while at home. She says that yesterday she was moving items around in her house and the next thing she knew she was on the floor. She says that she had not noticed and lightheadedness, dizziness and does not remember passing out. She had hit her head during the fall and had bleeding from her posterior scalp. She also had an abrasion to her left hand. She has not had any episodes of lightheadedness or dizziness in the past. She was seen in the emergency room where the posterior scalp laceration was stapled closed.[DL1.3]    Past Medical History[DL1.2]    Past medical history reviewed with no previously diagnosed medical problems.[DL1.3]    Past Surgical History[DL1.2]   Past surgical history reviewed with no previous surgeries identified.[DL1.3]  Prior to Admission Medications   Prior to Admission Medications   Prescriptions Last Dose Informant Patient Reported? Taking?   Furosemide (LASIX PO) 3/12/2018 at 0800  Yes Yes   Sig: Take 40 mg by mouth daily    HYDROCHLOROTHIAZIDE PO Unknown at Unknown time  Yes No   Sig: Take by mouth daily   HYDROcodone-acetaminophen (NORCO) 5-325 MG per tablet 3/12/2018 at 0800  Yes Yes   Sig: Take 1 tablet by mouth every 6 hours as needed   LORAZEPAM PO Unknown at Unknown time  Yes No   Sig: Take by mouth daily   Levothyroxine Sodium (SYNTHROID PO) 3/12/2018 at 0700  Yes Yes   Sig: Take 50 mcg by mouth daily    METFORMIN HCL PO 3/12/2018 at 0800  Yes Yes   Sig: Take 500 mg by mouth daily    Omeprazole (PRILOSEC PO) 3/12/2018 at 0700  Yes Yes   Sig: Take 20 mg by mouth 2 times daily (before meals)   Simvastatin (ZOCOR PO) 3/11/2018 at 1900  Yes Yes   Sig: Take 20 mg by mouth At Bedtime    Venlafaxine HCl (EFFEXOR PO) 3/12/2018 at 0800  Yes Yes   Sig: Take 75 mg by mouth daily   potassium chloride sveta er (K-DUR) 3/11/2018 at 1900  Yes Yes   Sig: Take 10 mEq by mouth once      Facility-Administered Medications: None     Allergies   Allergies   Allergen Reactions     Aspirin      Flexeril [Cyclobenzaprine]      Iodine      Sulfa Drugs        Social History   Social History     Social History     Marital status:      Spouse name: N/A     Number of children: N/A     Years of education: N/A     Occupational History     Not on file.     Social History Main Topics     Smoking status: Not on file     Smokeless tobacco: Not on file     Alcohol use Not on file     Drug use: Not on file     Sexual activity: Not on file     Other Topics Concern     Not on file     Social History Narrative     No narrative on file       Family History[DL1.2]   Family history reviewed with patient and is noncontributory.[DL1.3]    Review of Systems[DL1.2]   CONSTITUTIONAL: No fever, chills, sweats, fatigue   EYES: no visual blurring, no double vision or visual loss  ENT: no decrease in hearing, no tinnitus, no vertigo, no hoarseness  RESPIRATORY: no shortness of breath, no cough, no sputum   CARDIOVASCULAR: no palpitations, no chest  pain, no exertional  chest pain or pressure  GASTROINTESTINAL: no nausea or vomiting, or abd pain  GENITOURINARY: no dysuria, no frequency or hesitancy, no hematuria  MUSCULOSKELETAL: no weakness, no redness, no swelling, no joint pain,   SKIN: no rashes, ecchymoses  NEUROLOGIC: no numbness or tingling of hands, no numbness or tingling  of feet, no syncope, no tremors or weakness  PSYCHIATRIC: no sleep disturbances, no anxiety or depression[DL1.1]    Physical Exam   Temp: 98.4  F (36.9  C) Temp src: Tympanic BP: 134/68 Pulse: 71 Heart Rate: 65 Resp: 16 SpO2: 93 % O2 Device: None (Room air)[DL1.2]    Vital Signs with Ranges[DL1.1]  Temp:  [97.6  F (36.4  C)-98.6  F (37  C)] 98.4  F (36.9  C)  Pulse:  [71-76] 71  Heart Rate:  [63-86] 65  Resp:  [16-18] 16  BP: (112-165)/(49-68) 134/68  SpO2:  [92 %-97 %] 93 % 139 lbs 15.87 oz[DL1.2]    Primary Survey:  Airway: patient talking  Breathing: symmetric respiratory effort bilaterally  Circulation: central pulses present and peripheral pulses present  Disability: Pupils - left 4 mm and brisk, right 4 mm and brisk     [place] Coma Scale - Total[DL1.1] 15[DL1.3]/15  Eye Response (E):[DL1.1] 4[DL1.3]  4= spontaneous,  3= to verbal/voice, 2=  to pain, 1= No response   Verbal Response (V):[DL1.1] 5[DL1.3]   5= Orientated, converses,  4= Confused, converses, 3= Inappropriate words,  2= Incomprehensible sounds,  1=No response   Motor Response (M):[DL1.1] 6[DL1.3]   6= Obeys commands, 5= Localizes to pain, 4= Withdrawal to pain, 3=Fexion to pain, 2= Extension to pain, 1= No response    Secondary Survey:  General: alert, oriented to person, place, time  Head:[DL1.1] 1.5 cm laceration with staples intact on the posterior scalp with associated cephlohematoma[DL1.3], trachea midline  Eyes: PERRLA, pupils[DL1.1] 4[DL1.3]mm, EOMI, corneas and conjunctivae clear  Ears:[DL1.1] TMs obstructed with cerumen[DL1.3] and non-inflamed external ear canals  Nose: nares patent, no drainage, nasal septum  non-tender  Mouth/Throat: no exudates or erythema,  no dental tenderness or malocclusions, no tongue lacerations  Neck:[DL1.1]  No[DL1.3] cervical collar present. No midline posterior tenderness, full AROM without pain or tenderness   Chest/Pulmonary: normal respiratory rate and rhythm,  bilateral clear breath sounds, no wheezes, rales or rhonchi,[DL1.1] no palpable abnormalities[DL1.4]  Cardiovascular: S1, S2,  normal and regular rate and rhythm, no murmurs  Abdomen: soft, non-tender, no guarding, no rebound tenderness and no tenderness to palpation  : normal external genitalia, pelvis stable to lateral compression, no salcedo, no urine assess  Back/Spine: no deformity, no midline tenderness, no sacral tenderness,  no step-offs and no abrasions or contusions  Musculoskel/Extremities: normal extremities, full AROM of major joints without tenderness, edema, erythema, ecchymosis, or abrasions.   Hand: no gross deformities of hands or fingers. Full AROM of hand and fingers in flexion and extension.  strength equal and symmetric.   Skin: no rashes, laceration, ecchymosis, skin warm and dry.   Neuro: PERRLA, alert, oriented x[DL1.1] 3[DL1.3]. CN II-XII grossly intact. No focal deficits. Strength[DL1.1] 5[DL1.3]/5 x 4 extremities.  Sensation intact.  Psychiatric: affect/mood normal, cooperative, normal judgement/insight and memory intact[DL1.1]    Data[DL1.2]   UA RESULTS:  Recent Labs   Lab Test  03/12/18   2115   COLOR  Yellow   APPEARANCE  Slightly Cloudy   URINEGLC  Negative   URINEBILI  Negative   URINEKETONE  Negative   SG  1.013   UBLD  Negative   URINEPH  7.0   PROTEIN  Negative   NITRITE  Positive*   LEUKEST  Large*   RBCU  1   WBCU  >182*[DL1.1]      Results for orders placed or performed during the hospital encounter of 03/12/18 (from the past 24 hour(s))   CBC with platelets differential   Result Value Ref Range    WBC 2.6 (L) 4.0 - 11.0 10e9/L    RBC Count 3.95 3.8 - 5.2 10e12/L    Hemoglobin 7.0 (L) 11.7  - 15.7 g/dL    Hematocrit 25.0 (L) 35.0 - 47.0 %    MCV 63 (L) 78 - 100 fl    MCH 17.7 (L) 26.5 - 33.0 pg    MCHC 28.0 (L) 31.5 - 36.5 g/dL    RDW 20.1 (H) 10.0 - 15.0 %    Platelet Count 110 (L) 150 - 450 10e9/L    Diff Method Automated Method     % Neutrophils 57.1 %    % Lymphocytes 31.8 %    % Monocytes 8.0 %    % Eosinophils 2.3 %    % Basophils 0.4 %    % Immature Granulocytes 0.4 %    Nucleated RBCs 0 0 /100    Absolute Neutrophil 1.5 (L) 1.6 - 8.3 10e9/L    Absolute Lymphocytes 0.8 0.8 - 5.3 10e9/L    Absolute Monocytes 0.2 0.0 - 1.3 10e9/L    Absolute Eosinophils 0.1 0.0 - 0.7 10e9/L    Absolute Basophils 0.0 0.0 - 0.2 10e9/L    Abs Immature Granulocytes 0.0 0 - 0.4 10e9/L    Absolute Nucleated RBC 0.0     Elliptocytes P    Comprehensive metabolic panel   Result Value Ref Range    Sodium 141 133 - 144 mmol/L    Potassium 3.2 (L) 3.4 - 5.3 mmol/L    Chloride 107 94 - 109 mmol/L    Carbon Dioxide 25 20 - 32 mmol/L    Anion Gap 9 3 - 14 mmol/L    Glucose 119 (H) 70 - 99 mg/dL    Urea Nitrogen 13 7 - 30 mg/dL    Creatinine 0.88 0.52 - 1.04 mg/dL    GFR Estimate 63 >60 mL/min/1.7m2    GFR Estimate If Black 76 >60 mL/min/1.7m2    Calcium 8.2 (L) 8.5 - 10.1 mg/dL    Bilirubin Total 0.7 0.2 - 1.3 mg/dL    Albumin 3.6 3.4 - 5.0 g/dL    Protein Total 7.4 6.8 - 8.8 g/dL    Alkaline Phosphatase 150 40 - 150 U/L    ALT 26 0 - 50 U/L    AST 35 0 - 45 U/L   INR   Result Value Ref Range    INR 1.12 0.80 - 1.20   Troponin I   Result Value Ref Range    Troponin I ES <0.015 0.000 - 0.045 ug/L   CT Head w/o Contrast    Narrative    Exam: CT HEAD W/O CONTRAST    Clinical history:77 years Female fall;     Comparisons:None    Technique: Axial CT imaging of the head was performed Without  intervenous contrast.    FINDINGS:   Ventricles and sulci are symmetric. The gray-white matter  differentiation throughout the brain is well maintained. There is  moderate periventricular white matter change of chronic small vessel  ischemic  disease. There is no evidence of intracranial mass or  hemorrhage. Visualized portions of the paranasal sinuses and mastoid  air cells are well aerated. There is no evidence of skull fracture.    There is a posterior left para midline scalp laceration.      Impression    IMPRESSION: Posterior left para midline scalp wound. There is no  evidence of intracranial hemorrhage or skull fracture.    MATTIE CHARLES MD   Immunos occult blood   Result Value Ref Range    Occult Blood Slide 1 Negative NEG^Negative   Ferritin   Result Value Ref Range    Ferritin 5 (L) 8 - 252 ng/mL   Iron and iron binding capacity   Result Value Ref Range    Iron 14 (L) 35 - 180 ug/dL    Iron Binding Cap 592 (H) 240 - 430 ug/dL    Iron Saturation Index 2 (L) 15 - 46 %   Lactate Dehydrogenase   Result Value Ref Range    Lactate Dehydrogenase 191 81 - 234 U/L   TSH with free T4 reflex   Result Value Ref Range    TSH 1.91 0.40 - 4.00 mU/L   CBC with platelets differential   Result Value Ref Range    WBC 2.2 (L) 4.0 - 11.0 10e9/L    RBC Count 3.71 (L) 3.8 - 5.2 10e12/L    Hemoglobin 6.5 (LL) 11.7 - 15.7 g/dL    Hematocrit 23.5 (L) 35.0 - 47.0 %    MCV 63 (L) 78 - 100 fl    MCH 17.5 (L) 26.5 - 33.0 pg    MCHC 27.7 (L) 31.5 - 36.5 g/dL    RDW 20.0 (H) 10.0 - 15.0 %    Platelet Count 96 (L) 150 - 450 10e9/L    Diff Method Automated Method     % Neutrophils 59.4 %    % Lymphocytes 31.3 %    % Monocytes 6.9 %    % Eosinophils 1.4 %    % Basophils 0.5 %    % Immature Granulocytes 0.5 %    Nucleated RBCs 0 0 /100    Absolute Neutrophil 1.3 (L) 1.6 - 8.3 10e9/L    Absolute Lymphocytes 0.7 (L) 0.8 - 5.3 10e9/L    Absolute Monocytes 0.2 0.0 - 1.3 10e9/L    Absolute Eosinophils 0.0 0.0 - 0.7 10e9/L    Absolute Basophils 0.0 0.0 - 0.2 10e9/L    Abs Immature Granulocytes 0.0 0 - 0.4 10e9/L    Absolute Nucleated RBC 0.0    Reticulocyte Count   Result Value Ref Range    % Retic 1.5 0.5 - 2.0 %    Absolute Retic 57.1 25 - 95 10e9/L   ABO/Rh type and screen    Result Value Ref Range    ABO O     RH(D) Pos     Antibody Screen Neg     Test Valid Only At [facility]        Specimen Expires 03/15/2018    Blood component   Result Value Ref Range    Unit Number [id number]     Blood Component Type Red Blood Cells Leukocyte Reduced     Division Number 00     Status of Unit Released to care unit     Blood Product Code [id number]     Unit Status ISS    Blood component   Result Value Ref Range    Unit Number [id number]     Blood Component Type Red Blood Cells Leukocyte Reduced     Division Number 00     Status of Unit Released to care unit 03/13/2018 0157     Blood Product Code [id number]     Unit Status ISS    Blood component   Result Value Ref Range    Unit Number [id number]     Blood Component Type Red Blood Cells LeukoReduced (Part 2)     Division Number 00     Status of Unit Released to care unit 03/13/2018 0424     Blood Product Code [id number]     Unit Status ISS    UA with Microscopic   Result Value Ref Range    Color Urine Yellow     Appearance Urine Slightly Cloudy     Glucose Urine Negative NEG^Negative mg/dL    Bilirubin Urine Negative NEG^Negative    Ketones Urine Negative NEG^Negative mg/dL    Specific Gravity Urine 1.013 1.003 - 1.035    Blood Urine Negative NEG^Negative    pH Urine 7.0 4.7 - 8.0 pH    Protein Albumin Urine Negative NEG^Negative mg/dL    Urobilinogen mg/dL 2.0 0.0 - 2.0 mg/dL    Nitrite Urine Positive (A) NEG^Negative    Leukocyte Esterase Urine Large (A) NEG^Negative    Source Midstream Urine     WBC Urine >182 (H) 0 - 5 /HPF    RBC Urine 1 0 - 2 /HPF    Bacteria Urine Few (A) NEG^Negative /HPF   Active MRSA Surveillance Culture   Result Value Ref Range    Specimen Description Nares     Culture Micro Culture in progress    CT Abdomen Pelvis w Contrast    Narrative    CT ABDOMEN PELVIS W CONTRAST    CLINICAL HISTORY: Female, age 77 years,  abdominal pain and anemia; ;    Comparison:  None.    TECHNIQUE:  CT was performed of the  abdomen and pelvis with IV and  oral contrast. Sagittal, coronal and axial reconstructions were  reviewed.     FINDINGS:    Abdomen/Pelvis CT:  Lung Bases:  Mild centrilobular emphysema.    There is asymmetry in density breast tissue within the left breast  suggesting a 3 cm mass.    Esophagus/stomach: Large hiatal hernia.    Liver:  Normal.  Portable venous system: Patent.  Gallbladder: Surgically absent    Spleen: Splenomegaly.    Pancreas: Normal.    Adrenal Glands: Normal.    Kidneys: 2.5 cm parapelvic cyst midpole left kidney.  Ureters: Grossly normal.  Urinary bladder: Normal.    Abdominal Aorta: Scattered atherosclerotic calcifications.  IVC: Normal.    Lymph Nodes: Normal-sized nodes within the mesentery and  retroperitoneum.    Large and Small Bowel: Moderate volume of stool. No acute abnormality.  Appendix: Not seen. No secondary signs of appendicitis.    Pelvic Organs:  Atrophic uterus.  Peritoneum: Normal.  Bony structures: Scattered degenerative changes. No evidence of acute  or concerning of normality. Degenerative spinal listhesis of L5  relative to S1 with bilateral foraminal stenosis.    Other Findings:  Inguinal lymph nodes are prominent in size, at the  upper limits of normal.      Impression    IMPRESSION:  1.   Asymmetry of tissue within the left breast, concerning for a 3 cm  left breast mass.    2.  Splenomegaly with a number of normal-sized nodes throughout the  retroperitoneum and prominent nodes within the inguinal regions.    3.  Surgical absence of the gallbladder with slight prominence of the  intrahepatic portions of biliary tree.    This report is in agreement with the preliminary report.    ISAAC MARKHAM MD   Glucose by meter   Result Value Ref Range    Glucose 88 70 - 99 mg/dL   CBC with platelets differential   Result Value Ref Range    WBC 2.5 (L) 4.0 - 11.0 10e9/L    RBC Count 4.46 3.8 - 5.2 10e12/L    Hemoglobin 9.2 (L) 11.7 - 15.7 g/dL    Hematocrit 30.5 (L) 35.0 - 47.0 %     MCV 68 (L) 78 - 100 fl    MCH 20.6 (L) 26.5 - 33.0 pg    MCHC 30.2 (L) 31.5 - 36.5 g/dL    RDW 23.9 (H) 10.0 - 15.0 %    Platelet Count 94 (L) 150 - 450 10e9/L    Diff Method Automated Method     % Neutrophils 58.9 %    % Lymphocytes 29.8 %    % Monocytes 8.5 %    % Eosinophils 1.6 %    % Basophils 0.8 %    % Immature Granulocytes 0.4 %    Nucleated RBCs 0 0 /100    Absolute Neutrophil 1.5 (L) 1.6 - 8.3 10e9/L    Absolute Lymphocytes 0.7 (L) 0.8 - 5.3 10e9/L    Absolute Monocytes 0.2 0.0 - 1.3 10e9/L    Absolute Eosinophils 0.0 0.0 - 0.7 10e9/L    Absolute Basophils 0.0 0.0 - 0.2 10e9/L    Abs Immature Granulocytes 0.0 0 - 0.4 10e9/L    Absolute Nucleated RBC 0.0    Basic metabolic panel   Result Value Ref Range    Sodium 140 133 - 144 mmol/L    Potassium 3.9 3.4 - 5.3 mmol/L    Chloride 108 94 - 109 mmol/L    Carbon Dioxide 25 20 - 32 mmol/L    Anion Gap 7 3 - 14 mmol/L    Glucose 89 70 - 99 mg/dL    Urea Nitrogen 11 7 - 30 mg/dL    Creatinine 0.85 0.52 - 1.04 mg/dL    GFR Estimate 65 >60 mL/min/1.7m2    GFR Estimate If Black 79 >60 mL/min/1.7m2    Calcium 8.0 (L) 8.5 - 10.1 mg/dL[DL1.5]       Studies:[DL1.1]  CT Head w/o Contrast    (Results Pending)   CT Abdomen Pelvis w Contrast    (Results Pending)       Se Barahona[DL1.2]   3/13/2018[DL1.6]         Revision History        User Key Date/Time User Provider Type Action    > DL1.4 3/13/2018  2:18 PM Se Barahona MD Physician Sign     DL1.6 3/13/2018 11:32 AM Se Barahona MD Physician      DL1.5 3/13/2018 11:31 AM Se Barahona MD Physician      DL1.3 3/13/2018 11:23 AM Se Barahona MD Physician      DL1.2 3/13/2018  8:05 AM Se Barahona MD Physician      DL1.1 3/13/2018  8:04 AM Se Barahona MD Physician                      Progress Notes - Physician (Notes for yesterday and today)      Progress Notes by Aman Phipps MD at 3/13/2018 11:40 AM     Author:  Aman Phipps MD Service:  Internal Medicine  Author Type:  Physician    Filed:  3/13/2018 11:54 AM Date of Service:  3/13/2018 11:40 AM Creation Time:  3/13/2018 11:40 AM    Status:  Signed :  Aman Phipps MD (Physician)         [facility]    Hospitalist Progress Note      Assessment & Plan   Gina Beal is a 77 year old female who was admitted on 3/12/2018.      1.  Syncope with head laceration:   Patient was standing on a stool ranging her knickknacks and the next thing she knew she was on the floor.  Did suffer a small head laceration on her top of her head.  She had no chest pains or dizziness palpitations shortness of breath at all with this episode.  She is not sure if she lost her balance or tripped or exactly what happened.  She did have her head laceration stapled in the ER with 4 staples.  She has had no dysrhythmias overnight on telemetry.  Hemodynamically she has been stable.  Question of whether or not her anemia caused her episode is the question what and likely true.  We will get her up and ambulate her now that she has been transfused and see how she does.  Staples will be removed in 7-10 days.    2.  Anemia in setting of pancytopenia:   Her hemoglobin was 6.5.  She has been transfused 3 units of packed red blood cells her hemoglobin is now up to 9.2.  She is remained hemodynamically stable.  She denies any evidence of bleeding issues.  Has never thrown up any blood.  Has not noticed any black tarry stools or red stools at all.  Unfortunately her last hemoglobin check was back in 2013 at which time was 11.  Her stool was guaiac negative.  She definitely has iron deficiency.  With microcytosis.  A peripheral smear was done is pending at this time.  As above this does not appear to be an acute blood loss anemia.  In discussing with her she did have a colonoscopy however the last time that was performed I believe was almost 6+ years ago.  She did have a adenomatous polyp at that time.  Has never had any heartburn  complaints dysphasia.  She does have a significant hiatal hernia noted on her CT scan.  Given her microcytosis and iron deficiency I think she will need to have a colonoscopy and endoscopy performed to evaluate her.  This is not an acute need.  Have briefly discussed this with Dr. Barahona surgeon was saw her for her trauma.  We can set this up as an outpatient with a clinic visit.  Given her leukopenia and thrombocytopenia further evaluation may need to be entertained with possible bone marrow biopsy also.    3.  Left breast mass:   On CT scan there is noted a 3 cm mass.  The patient does have yearly mammograms.  No palpable masses noted currently.  We will need this further evaluated with repeat mammography in her primary care provider clinic with possible further evaluation biopsy etc. pending those results.    4.  Pyuria/UTI: She was given some IV Rocephin for this.  Her urine cultures pending at this time she has has no fever.    Diet: Combination Diet Low Saturated Fat Na <2400mg Diet, No Caffeine Diet  Fluids: IV lock    # Pain Assessment:   Current Pain Score 3/13/2018 3/13/2018 3/13/2018   Patient currently in pain? yes yes yes   Pain score (0-10) 2 2 4   Pain location Head Head Head   Pain descriptors Dull Dull Sore   - Gina is experiencing pain due to chronic neck pain. Pain management was discussed and the plan was created in a collaborative fashion.  Justinofanta's response to the current recommendations: engaged  compliant  - Opioid regimen: Norco  - Response to opioid medications: Reduction of symptoms   - Bowel regimen: not needed          DVT Prophylaxis: Pneumatic Compression Devices  Code Status: Full Code    Disposition: Expected discharge in 1-2 days once stable hgb.    Aman Phipps    Interval History   Patient is alert oriented.  Feels actually quite good.  Has a little sore from head laceration but no new aches or pains.  Denies any shortness of breath chest pain abdominal pain heartburn at  all.  No peripheral edema no fevers chills sweats cough purulent sputum.  Once again she is not sure if she actually lost consciousness or she lost her balance when she fell from the stool.  Once again she is very adamant she has not noted any black tarry stools blood in her stool but her urine.  No abdominal pain no significant heartburn symptoms.  She has been under a fair amount of stress given her 's recent diagnosis of cancer.    -Data reviewed today: I reviewed all new labs and imaging results over the last 24 hours. I personally reviewed no images or EKG's today.    Physical Exam   Temp: 98.3  F (36.8  C) Temp src: Tympanic BP: 135/64 Pulse: 71 Heart Rate: 69 Resp: 16 SpO2: 92 % O2 Device: None (Room air)    Vitals:    03/12/18 2024   Weight: 63.5 kg (139 lb 15.9 oz)     Vital Signs with Ranges  Temp:  [97.6  F (36.4  C)-98.6  F (37  C)] 98.3  F (36.8  C)  Pulse:  [71-76] 71  Heart Rate:  [63-86] 69  Resp:  [16-18] 16  BP: (112-165)/(49-68) 135/64  SpO2:  [92 %-97 %] 92 %  I/O last 3 completed shifts:  In: 942.5   Out: 300 [Urine:300]    Peripheral IV 03/12/18 Right Upper forearm (Active)   Site Assessment WDL 3/13/2018 10:13 AM   Line Status Saline locked 3/13/2018 10:13 AM   Phlebitis Scale 0-->no symptoms 3/13/2018 10:13 AM   Infiltration Scale 0 3/13/2018 10:13 AM   Number of days:1     No line/device    Constitutional: Alert and oriented x3. No distress    HEENT: Head laceration on the top of her head with 4 staples looks fine., PERRL, EOMI, mouth moist, neck supple, no LN.     Cardiovascular: RRR. no Murmur, no  rubs, or gallops.  JVD flat.  Bruits no.  Pulses 2+    Respiratory: Clear to auscultation bilaterally    Abdomen: Soft, NTND, no organomegaly. Bowel sounds present    Extremities: Warm/dry. No edema    Neuro:   Non focal, cranial nerves intact, Moves all extremities.  Medications       cefTRIAXone  1 g Intravenous Q24H     venlafaxine  75 mg Oral Daily with breakfast     diatrizoate  meglumine-sodium  30 mL Oral Once     levothyroxine (SYNTHROID/LEVOTHROID) tablet 50 mcg  50 mcg Oral Daily     omeprazole (priLOSEC) CR capsule 20 mg  20 mg Oral BID AC     simvastatin (ZOCOR) tablet 20 mg  20 mg Oral At Bedtime     docusate sodium  100 mg Oral BID     potassium chloride  40 mEq Oral BID       Data     Recent Labs  Lab 03/13/18  0743 03/12/18  2056 03/12/18  1744   WBC 2.5* 2.2* 2.6*   HGB 9.2* 6.5* 7.0*   MCV 68* 63* 63*   PLT 94* 96* 110*   INR  --   --  1.12     --  141   POTASSIUM 3.9  --  3.2*   CHLORIDE 108  --  107   CO2 25  --  25   BUN 11  --  13   CR 0.85  --  0.88   ANIONGAP 7  --  9   CIPRIANO 8.0*  --  8.2*   GLC 89  --  119*   ALBUMIN  --   --  3.6   PROTTOTAL  --   --  7.4   BILITOTAL  --   --  0.7   ALKPHOS  --   --  150   ALT  --   --  26   AST  --   --  35   TROPI  --   --  <0.015       Recent Results (from the past 24 hour(s))   CT Head w/o Contrast    Narrative    Exam: CT HEAD W/O CONTRAST    Clinical history:77 years Female fall;     Comparisons:None    Technique: Axial CT imaging of the head was performed Without  intervenous contrast.    FINDINGS:   Ventricles and sulci are symmetric. The gray-white matter  differentiation throughout the brain is well maintained. There is  moderate periventricular white matter change of chronic small vessel  ischemic disease. There is no evidence of intracranial mass or  hemorrhage. Visualized portions of the paranasal sinuses and mastoid  air cells are well aerated. There is no evidence of skull fracture.    There is a posterior left para midline scalp laceration.      Impression    IMPRESSION: Posterior left para midline scalp wound. There is no  evidence of intracranial hemorrhage or skull fracture.    MATTIE CHARLES MD   CT Abdomen Pelvis w Contrast    Narrative    CT ABDOMEN PELVIS W CONTRAST    CLINICAL HISTORY: Female, age 77 years,  abdominal pain and anemia; ;    Comparison:  None.    TECHNIQUE:  CT was performed of the abdomen  and pelvis with IV and  oral contrast. Sagittal, coronal and axial reconstructions were  reviewed.     FINDINGS:    Abdomen/Pelvis CT:  Lung Bases:  Mild centrilobular emphysema.    There is asymmetry in density breast tissue within the left breast  suggesting a 3 cm mass.    Esophagus/stomach: Large hiatal hernia.    Liver:  Normal.  Portable venous system: Patent.  Gallbladder: Surgically absent    Spleen: Splenomegaly.    Pancreas: Normal.    Adrenal Glands: Normal.    Kidneys: 2.5 cm parapelvic cyst midpole left kidney.  Ureters: Grossly normal.  Urinary bladder: Normal.    Abdominal Aorta: Scattered atherosclerotic calcifications.  IVC: Normal.    Lymph Nodes: Normal-sized nodes within the mesentery and  retroperitoneum.    Large and Small Bowel: Moderate volume of stool. No acute abnormality.  Appendix: Not seen. No secondary signs of appendicitis.    Pelvic Organs:  Atrophic uterus.  Peritoneum: Normal.  Bony structures: Scattered degenerative changes. No evidence of acute  or concerning of normality. Degenerative spinal listhesis of L5  relative to S1 with bilateral foraminal stenosis.    Other Findings:  Inguinal lymph nodes are prominent in size, at the  upper limits of normal.      Impression    IMPRESSION:  1.   Asymmetry of tissue within the left breast, concerning for a 3 cm  left breast mass.    2.  Splenomegaly with a number of normal-sized nodes throughout the  retroperitoneum and prominent nodes within the inguinal regions.    3.  Surgical absence of the gallbladder with slight prominence of the  intrahepatic portions of biliary tree.    This report is in agreement with the preliminary report.    ISAAC MARKHAM MD[SS1.1]        Revision History        User Key Date/Time User Provider Type Action    > SS1.1 3/13/2018 11:54 AM Aman Phipps MD Physician Sign                  Procedure Notes     No notes of this type exist for this encounter.      Progress Notes - Therapies (Notes from 03/11/18  through 03/14/18)     No notes of this type exist for this encounter.                                          INTERAGENCY TRANSFER FORM - LAB / IMAGING / EKG / EMG RESULTS   3/12/2018                       [facility]: [phone]            Unresulted Labs     None         Lab Results - 3 Days      Active MRSA Surveillance Culture [[id number]]  Resulted: 03/14/18 0745, Result status: Final result    Ordering provider: Kassy Ayala MD  03/12/18 2120 Resulting lab: [facility]    Specimen Information    Type Source Collected On   Nares Nose 03/12/18 2115          Components       Value Reference Range Flag Lab   Specimen Description Nares      Culture Micro No MRSA isolated   HI            Basic metabolic panel [[id number]] (Abnormal)  Resulted: 03/14/18 0548, Result status: Final result    Ordering provider: Aman Phipps MD  03/14/18 0000 Resulting lab: [facility]    Specimen Information    Type Source Collected On   Blood  03/14/18 0527          Components       Value Reference Range Flag Lab   Sodium 143 133 - 144 mmol/L  HI   Potassium 4.4 3.4 - 5.3 mmol/L  HI   Chloride 112 94 - 109 mmol/L H HI   Carbon Dioxide 25 20 - 32 mmol/L  HI   Anion Gap 6 3 - 14 mmol/L  HI   Glucose 90 70 - 99 mg/dL  HI   Urea Nitrogen 11 7 - 30 mg/dL  HI   Creatinine 0.88 0.52 - 1.04 mg/dL  HI   GFR Estimate 63 >60 mL/min/1.7m2  HI   Comment:  Non  GFR Calc   GFR Estimate If Black 76 >60 mL/min/1.7m2  HI   Comment:  African American GFR Calc   Calcium 8.1 8.5 - 10.1 mg/dL L HI            CBC with platelets differential [[id number]] (Abnormal)  Resulted: 03/14/18 0541, Result status: Final result    Ordering provider: Aman Phipps MD  03/14/18 0000 Resulting lab: [facility]    Specimen Information    Type Source Collected On   Blood  03/14/18 0527          Components       Value Reference Range Flag Lab   WBC 2.1 4.0 - 11.0 10e9/L L HI   RBC  Count 4.43 3.8 - 5.2 10e12/L  HI   Hemoglobin 9.1 11.7 - 15.7 g/dL L HI   Hematocrit 30.6 35.0 - 47.0 % L HI   MCV 69 78 - 100 fl L HI   MCH 20.5 26.5 - 33.0 pg L HI   MCHC 29.7 31.5 - 36.5 g/dL L HI   RDW 23.9 10.0 - 15.0 % H HI   Platelet Count 86 150 - 450 10e9/L L HI   Diff Method Automated Method   HI   % Neutrophils 51.8 %  HI   % Lymphocytes 35.5 %  HI   % Monocytes 9.0 %  HI   % Eosinophils 2.8 %  HI   % Basophils 0.9 %  HI   % Immature Granulocytes 0.0 %  HI   Nucleated RBCs 0 0 /100  HI   Absolute Neutrophil 1.1 1.6 - 8.3 10e9/L L HI   Absolute Lymphocytes 0.8 0.8 - 5.3 10e9/L  HI   Absolute Monocytes 0.2 0.0 - 1.3 10e9/L  HI   Absolute Eosinophils 0.1 0.0 - 0.7 10e9/L  HI   Absolute Basophils 0.0 0.0 - 0.2 10e9/L  HI   Abs Immature Granulocytes 0.0 0 - 0.4 10e9/L  HI   Absolute Nucleated RBC 0.0   HI            Folate [[id number]]  Resulted: 03/13/18 2159, Result status: Final result    Ordering provider: Kassy Ayala MD  03/12/18 2033 Resulting lab: [facility]    Specimen Information    Type Source Collected On   Blood  03/12/18 2056          Components       Value Reference Range Flag Lab   Folate 36.5 >5.4 ng/mL  51            Vitamin B12 [[id number]]  Resulted: 03/13/18 2109, Result status: Final result    Ordering provider: Kassy Ayala MD  03/12/18 2033 Resulting lab: [facility]    Specimen Information    Type Source Collected On   Blood  03/12/18 2056          Components       Value Reference Range Flag Lab   Vitamin B12 322 193 - 986 pg/mL  51            Blood Morphology Pathologist Review [[id number]]  Resulted: 03/13/18 1452, Result status: Final result    Ordering provider: Kassy Ayala MD  03/12/18 2033 [facility]    Specimen Information    Type Source Collected On   Blood  03/12/18 7857          Components       Value Reference Range Flag Lab   Copath Report --      Result:         Patient Name: DIANA  MONCHO COE  MR#: [id number]  Specimen #: [id number]  Collected: 3/12/2018  Received: 3/13/2018  Reported: 3/13/2018 14:32  Ordering Phy(s): LORAINE RENO  Additional Phy(s): NICOL DIXON    For improved result formatting, select 'View Enhanced Report Format' under   Linked Documents section.    TEST(S):  Peripheral Blood Morphology    FINAL DIAGNOSIS:  Peripheral morphology  - Hypochromic microcytic anemia, moderate  - Iron studies consistent with iron deficiency  - Moderate thrombocytopenia  - Increased rouleaux formation, mild  - Mild neutropenia with toxic neutrophils    COMMENT:  The red blood cell morphology and iron studies are consistent with iron   deficiency, but in classic iron  deficiency, there is a thrombocytosis.  This patient has thrombocytopenia   raising the possibility of a  separate etiology or the thrombocytopenia.  Increased rouleaux formation   and toxic neutrophils are nonspecific  but increased rouleaux formation may be seen with a paraprotei n.  Electronically signed out by:    Dave Goldstein M.D.    PERIPHERAL BLOOD DATA:  Red cells: The red cells are mildly decreased in number with moderate   increased anisopoikilocytosis and  hypochromic microcytic red cells.  Polychromasia is not increased.    Rouleaux formation is mildly increased.  Basophilic stippling is not a feature.    Platelets: The platelets are moderately decreased in number but normal in   morphology.    Leukocytes: There is a mild neutropenia and the neutrophils have toxic   granulation.    CLINICAL LAB RESULTS:  Battery Order No. Lab Test Code Clinical Result Ref. Range Units Result   Date  Hemogram/Diff/PLT [id number] HI WBC Count L 2.2 4.0-11.0 10e9/L 3/12/2018   21:17       RBC Count L 3.71 3.8-5.2 10e12/L 3/12/2018 21:17       Hemoglobin SEE TEXT 11.7-15.7 g/dL 3/12/2018 21:17       Text/Comments:   Flags  LL  6.5 This result has been called to MILTON LYNN by Clay Shin on 03 12 2018 at  2116, and has been read back.        Hematocrit L 23.5 35.0-47.0 %  3/12/2018 21:17       MCV L 63  fl 3/12/2018 21:17       MCH L 17.5 26.5-33.0 pg 3/12/2018 21:17       MCHC L 27.7 31.5-36.5 g/dL 3/12/2018 21:17       RDW H 20.0 10.0-15.0 % 3/12/2018 21:17       Platelet Count L 96 150-450 10e9/L 3/12/2018 21:17        SEE TEXT   3/12/2018 21:17       Text/Comments:  Automated Method       % Neutrophils 59.4  % 3/12/2018 21:17       % Lymphocytes 31.3  % 3/12/2018 21:17       % Monocytes 6.9  % 3/12/2018 21:17       % Eosinophils 1.4  % 3/12/2018 21:17       % Basophils 0.5  % 3/12/2018 21:17       % Immature Grans 0.5  % 3/12/2018 21:17       Nucleated RBCs 0 0 /100 3/12/2018 21:17       abs Neutrophils L 1.3 1.6-8.3 10e9/L 3/12/2018 21:17       abs Lymphocytes L 0.7 0.8-5.3 10e9/L 3/12/2018 21:17       abs Monocytes 0.2 0.0-1.3 10e9/L 3/12/2018 21:17       abs Eosinophils 0.0 0.0-0.7 10e9/L 3/12/2018 21:17       abs Basophils 0.0 0.0-0.2 10e9/L 3/12/2018 21:17       abs Imm Granulocytes 0.0 0-0.4 10e9/L 3/12/2018 21:17       abs NRBC 0.0   3 /12/2018 21:17    Retic   Retic % 1.5 0.5-2.0 % 3/12/2018 21:17    CPT Codes:  A: 12430-TXHP    TESTING LAB LOCATION:  [address] [address]  [phone]    COLLECTION SITE:  Client:  [facility]  Location:  [facility] (B)              Basic metabolic panel [[id number]] (Abnormal)  Resulted: 03/13/18 0801, Result status: Final result    Ordering provider: Kassy Ayala MD  03/13/18 0001 Resulting lab: [facility]    Specimen Information    Type Source Collected On   Blood  03/13/18 0743          Components       Value Reference Range Flag Lab   Sodium 140 133 - 144 mmol/L  HI   Potassium 3.9 3.4 - 5.3 mmol/L  HI   Chloride 108 94 - 109 mmol/L  HI   Carbon Dioxide 25 20 - 32 mmol/L  HI   Anion Gap 7 3 - 14 mmol/L  HI   Glucose 89 70 - 99 mg/dL  HI   Urea Nitrogen 11 7 - 30 mg/dL  HI   Creatinine 0.85 0.52 - 1.04 mg/dL  HI   GFR  Estimate 65 >60 mL/min/1.7m2  HI   Comment:  Non  GFR Calc   GFR Estimate If Black 79 >60 mL/min/1.7m2  HI   Comment:  African American GFR Calc   Calcium 8.0 8.5 - 10.1 mg/dL L HI            CBC with platelets differential [[id number]] (Abnormal)  Resulted: 03/13/18 0753, Result status: Final result    Ordering provider: Kassy Ayala MD  03/13/18 0001 Resulting lab: [facility]    Specimen Information    Type Source Collected On   Blood  03/13/18 0743          Components       Value Reference Range Flag Lab   WBC 2.5 4.0 - 11.0 10e9/L L HI   RBC Count 4.46 3.8 - 5.2 10e12/L  HI   Hemoglobin 9.2 11.7 - 15.7 g/dL L HI   Hematocrit 30.5 35.0 - 47.0 % L HI   MCV 68 78 - 100 fl L HI   MCH 20.6 26.5 - 33.0 pg L HI   MCHC 30.2 31.5 - 36.5 g/dL L HI   RDW 23.9 10.0 - 15.0 % H HI   Platelet Count 94 150 - 450 10e9/L L HI   Diff Method Automated Method   HI   % Neutrophils 58.9 %  HI   % Lymphocytes 29.8 %  HI   % Monocytes 8.5 %  HI   % Eosinophils 1.6 %  HI   % Basophils 0.8 %  HI   % Immature Granulocytes 0.4 %  HI   Nucleated RBCs 0 0 /100  HI   Absolute Neutrophil 1.5 1.6 - 8.3 10e9/L L HI   Absolute Lymphocytes 0.7 0.8 - 5.3 10e9/L L HI   Absolute Monocytes 0.2 0.0 - 1.3 10e9/L  HI   Absolute Eosinophils 0.0 0.0 - 0.7 10e9/L  HI   Absolute Basophils 0.0 0.0 - 0.2 10e9/L  HI   Abs Immature Granulocytes 0.0 0 - 0.4 10e9/L  HI   Absolute Nucleated RBC 0.0   HI            Glucose by meter [[id number]]  Resulted: 03/13/18 0352, Result status: Final result    Ordering provider: Kassy Ayala MD  03/13/18 0248 Resulting lab: POINT OF CARE TEST, GLUCOSE    Specimen Information    Type Source Collected On     03/13/18 0248          Components       Value Reference Range Flag Lab   Glucose 88 70 - 99 mg/dL  170            Ferritin [[id number]] (Abnormal)  Resulted: 03/12/18 2142, Result status: Final result    Ordering provider: Kassy Ayala MD  03/12/18 2033 Resulting lab: [place]  [facility]    Specimen Information    Type Source Collected On   Blood  03/12/18 2056          Components       Value Reference Range Flag Lab   Ferritin 5 8 - 252 ng/mL L HI            Iron and iron binding capacity [[id number]] (Abnormal)  Resulted: 03/12/18 2142, Result status: Final result    Ordering provider: Kassy Ayala MD  03/12/18 2033 Resulting lab: [facility]    Specimen Information    Type Source Collected On   Blood  03/12/18 2056          Components       Value Reference Range Flag Lab   Iron 14 35 - 180 ug/dL L HI   Iron Binding Cap 592 240 - 430 ug/dL H HI   Iron Saturation Index 2 15 - 46 % L HI            UA with Microscopic [[id number]] (Abnormal)  Resulted: 03/12/18 2137, Result status: Final result    Ordering provider: Tomasa Coppola MD  03/12/18 1731 Resulting lab: [facility]    Specimen Information    Type Source Collected On   Midstream Urine  03/12/18 2115          Components       Value Reference Range Flag Lab   Color Urine Yellow   HI   Appearance Urine Slightly Cloudy   HI   Glucose Urine Negative NEG^Negative mg/dL  HI   Bilirubin Urine Negative NEG^Negative  HI   Ketones Urine Negative NEG^Negative mg/dL  HI   Specific Gravity Urine 1.013 1.003 - 1.035  HI   Blood Urine Negative NEG^Negative  HI   pH Urine 7.0 4.7 - 8.0 pH  HI   Protein Albumin Urine Negative NEG^Negative mg/dL  HI   Urobilinogen mg/dL 2.0 0.0 - 2.0 mg/dL  HI   Nitrite Urine Positive NEG^Negative A HI   Leukocyte Esterase Urine Large NEG^Negative A HI   Source Midstream Urine   HI   WBC Urine >182 0 - 5 /HPF H HI   RBC Urine 1 0 - 2 /HPF  HI   Bacteria Urine Few NEG^Negative /HPF A HI            Lactate Dehydrogenase [[id number]]  Resulted: 03/12/18 2127, Result status: Final result    Ordering provider: Kassy Ayala MD  03/12/18 2033 Resulting lab: [facility]    Specimen Information    Type Source Collected On   Blood  03/12/18 2056           Components       Value Reference Range Flag Lab   Lactate Dehydrogenase 191 81 - 234 U/L  HI            TSH with free T4 reflex [[id number]]  Resulted: 03/12/18 2127, Result status: Final result    Ordering provider: Kassy Ayala MD  03/12/18 2033 Resulting lab: [facility]    Specimen Information    Type Source Collected On   Blood  03/12/18 2056          Components       Value Reference Range Flag Lab   TSH 1.91 0.40 - 4.00 mU/L  HI            CBC with platelets differential [[id number]] (Abnormal)  Resulted: 03/12/18 2117, Result status: Final result    Ordering provider: Kassy Ayala MD  03/12/18 2033 Resulting lab: [facility]    Specimen Information    Type Source Collected On   Blood  03/12/18 2056          Components       Value Reference Range Flag Lab   WBC 2.2 4.0 - 11.0 10e9/L L HI   RBC Count 3.71 3.8 - 5.2 10e12/L L HI   Hemoglobin 6.5 11.7 - 15.7 g/dL LL HI   Comment:         This result has been called to MILTON LYNN by Clay Shin on 03 12 2018 at   2116, and has been read back.      Hematocrit 23.5 35.0 - 47.0 % L HI   MCV 63 78 - 100 fl L HI   MCH 17.5 26.5 - 33.0 pg L HI   MCHC 27.7 31.5 - 36.5 g/dL L HI   RDW 20.0 10.0 - 15.0 % H HI   Platelet Count 96 150 - 450 10e9/L L HI   Diff Method Automated Method   HI   % Neutrophils 59.4 %  HI   % Lymphocytes 31.3 %  HI   % Monocytes 6.9 %  HI   % Eosinophils 1.4 %  HI   % Basophils 0.5 %  HI   % Immature Granulocytes 0.5 %  HI   Nucleated RBCs 0 0 /100  HI   Absolute Neutrophil 1.3 1.6 - 8.3 10e9/L L HI   Absolute Lymphocytes 0.7 0.8 - 5.3 10e9/L L HI   Absolute Monocytes 0.2 0.0 - 1.3 10e9/L  HI   Absolute Eosinophils 0.0 0.0 - 0.7 10e9/L  HI   Absolute Basophils 0.0 0.0 - 0.2 10e9/L  HI   Abs Immature Granulocytes 0.0 0 - 0.4 10e9/L  HI   Absolute Nucleated RBC 0.0   HI            Reticulocyte Count [[id number]]  Resulted: 03/12/18 2117, Result status: Final result    Ordering provider: Kassy Ayala,  MD  03/12/18 2033 Resulting lab: [facility]    Specimen Information    Type Source Collected On   Blood  03/12/18 2056          Components       Value Reference Range Flag Lab   % Retic 1.5 0.5 - 2.0 %  HI   Absolute Retic 57.1 25 - 95 10e9/L  HI            Haptoglobin [[id number]]  Resulted: 03/12/18 2056, Result status: In process    Ordering provider: Kassy Ayala MD  03/12/18 2033 Resulting lab: [facility]    Specimen Information    Type Source Collected On   Blood  03/12/18 2056            Immunos occult blood [[id number]]  Resulted: 03/12/18 1908, Result status: Final result    Ordering provider: Tomasa Coppola MD  03/12/18 1856 Resulting lab: [facility]    Specimen Information    Type Source Collected On   Stool  03/12/18 1855          Components       Value Reference Range Flag Lab   Occult Blood Slide 1 Negative NEG^Negative  HI            Comprehensive metabolic panel [[id number]] (Abnormal)  Resulted: 03/12/18 1808, Result status: Final result    Ordering provider: Tomasa Coppola MD  03/12/18 173 Resulting lab: [facility]    Specimen Information    Type Source Collected On   Blood  03/12/18 1744          Components       Value Reference Range Flag Lab   Sodium 141 133 - 144 mmol/L  HI   Potassium 3.2 3.4 - 5.3 mmol/L L HI   Chloride 107 94 - 109 mmol/L  HI   Carbon Dioxide 25 20 - 32 mmol/L  HI   Anion Gap 9 3 - 14 mmol/L  HI   Glucose 119 70 - 99 mg/dL H HI   Urea Nitrogen 13 7 - 30 mg/dL  HI   Creatinine 0.88 0.52 - 1.04 mg/dL  HI   GFR Estimate 63 >60 mL/min/1.7m2  HI   Comment:  Non  GFR Calc   GFR Estimate If Black 76 >60 mL/min/1.7m2  HI   Comment:  African American GFR Calc   Calcium 8.2 8.5 - 10.1 mg/dL L HI   Bilirubin Total 0.7 0.2 - 1.3 mg/dL  HI   Albumin 3.6 3.4 - 5.0 g/dL  HI   Protein Total 7.4 6.8 - 8.8 g/dL  HI   Alkaline Phosphatase 150 40 - 150 U/L  HI   ALT 26 0 - 50 U/L  HI   AST 35 0 - 45 U/L  HI             Troponin I [[id number]]  Resulted: 03/12/18 1808, Result status: Final result    Ordering provider: Tomasa Coppola MD  03/12/18 1734 Resulting lab: [facility]    Specimen Information    Type Source Collected On   Blood  03/12/18 1744          Components       Value Reference Range Flag Lab   Troponin I ES <0.015 0.000 - 0.045 ug/L  HI   Comment:         The 99th percentile for upper reference range is 0.045 ug/L.  Troponin values   in the range of 0.045 - 0.120 ug/L may be associated with risks of adverse   clinical events.              CBC with platelets differential [[id number]] (Abnormal)  Resulted: 03/12/18 1805, Result status: Final result    Ordering provider: Tomasa Coppola MD  03/12/18 1734 Resulting lab: [facility]    Specimen Information    Type Source Collected On   Blood  03/12/18 1744          Components       Value Reference Range Flag Lab   WBC 2.6 4.0 - 11.0 10e9/L L HI   RBC Count 3.95 3.8 - 5.2 10e12/L  HI   Hemoglobin 7.0 11.7 - 15.7 g/dL L HI   Hematocrit 25.0 35.0 - 47.0 % L HI   MCV 63 78 - 100 fl L HI   MCH 17.7 26.5 - 33.0 pg L HI   MCHC 28.0 31.5 - 36.5 g/dL L HI   RDW 20.1 10.0 - 15.0 % H HI   Platelet Count 110 150 - 450 10e9/L L HI   Diff Method Automated Method   HI   % Neutrophils 57.1 %  HI   % Lymphocytes 31.8 %  HI   % Monocytes 8.0 %  HI   % Eosinophils 2.3 %  HI   % Basophils 0.4 %  HI   % Immature Granulocytes 0.4 %  HI   Nucleated RBCs 0 0 /100  HI   Absolute Neutrophil 1.5 1.6 - 8.3 10e9/L L HI   Absolute Lymphocytes 0.8 0.8 - 5.3 10e9/L  HI   Absolute Monocytes 0.2 0.0 - 1.3 10e9/L  HI   Absolute Eosinophils 0.1 0.0 - 0.7 10e9/L  HI   Absolute Basophils 0.0 0.0 - 0.2 10e9/L  HI   Abs Immature Granulocytes 0.0 0 - 0.4 10e9/L  HI   Absolute Nucleated RBC 0.0   HI   Elliptocytes P   HI            INR [[id number]]  Resulted: 03/12/18 1804, Result status: Final result    Ordering provider: Tomasa Coppola MD  03/12/18 1733 Resulting lab:  [facility]    Specimen Information    Type Source Collected On   Blood  03/12/18 1744          Components       Value Reference Range Flag Lab   INR 1.12 0.80 - 1.20  HI            Testing Performed By     Lab - Abbreviation Name Director Address Valid Date Range    45 - ALC133 [facility] Unknown Unknown 01/28/02 0000 - Present    51 - Unknown [facility] [place] Unknown [address] [facility] [id number] 12/31/14 1010 - Present    88 - Unknown [facility] Unknown Unknown 10/30/02 0000 - Present    170 - Unknown POINT OF CARE TEST, GLUCOSE Unknown Unknown 10/31/11 1114 - Present    210 - HI [facility] Unknown [address] [id number] 05/08/15 1057 - Present               Imaging Results - 3 Days      CT Abdomen Pelvis w Contrast [[id number]]  Resulted: 03/13/18 0909, Result status: Final result    Ordering provider: Tomasa Coppola MD  03/12/18 1957 Resulted by: Elliot Jordan MD    Performed: 03/12/18 2207 - 03/12/18 2207 Resulting lab: RADIOLOGY RESULTS    Narrative:       CT ABDOMEN PELVIS W CONTRAST    CLINICAL HISTORY: Female, age 77 years,  abdominal pain and anemia; ;    Comparison:  None.    TECHNIQUE:  CT was performed of the abdomen and pelvis with IV and  oral contrast. Sagittal, coronal and axial reconstructions were  reviewed.     FINDINGS:    Abdomen/Pelvis CT:  Lung Bases:  Mild centrilobular emphysema.    There is asymmetry in density breast tissue within the left breast  suggesting a 3 cm mass.    Esophagus/stomach: Large hiatal hernia.    Liver:  Normal.  Portable venous system: Patent.  Gallbladder: Surgically absent    Spleen: Splenomegaly.    Pancreas: Normal.    Adrenal Glands: Normal.    Kidneys: 2.5 cm parapelvic cyst midpole left kidney.  Ureters: Grossly normal.  Urinary bladder: Normal.    Abdominal Aorta: Scattered atherosclerotic calcifications.  IVC: Normal.    Lymph Nodes: Normal-sized nodes within the mesentery  and  retroperitoneum.    Large and Small Bowel: Moderate volume of stool. No acute abnormality.  Appendix: Not seen. No secondary signs of appendicitis.    Pelvic Organs:  Atrophic uterus.  Peritoneum: Normal.  Bony structures: Scattered degenerative changes. No evidence of acute  or concerning of normality. Degenerative spinal listhesis of L5  relative to S1 with bilateral foraminal stenosis.    Other Findings:  Inguinal lymph nodes are prominent in size, at the  upper limits of normal.      Impression:       IMPRESSION:  1.   Asymmetry of tissue within the left breast, concerning for a 3 cm  left breast mass.    2.  Splenomegaly with a number of normal-sized nodes throughout the  retroperitoneum and prominent nodes within the inguinal regions.    3.  Surgical absence of the gallbladder with slight prominence of the  intrahepatic portions of biliary tree.    This report is in agreement with the preliminary report.    ISAAC MARKHAM MD      CT Head w/o Contrast [[id number]]  Resulted: 03/13/18 0824, Result status: Final result    Ordering provider: Tomasa Coppola MD  03/12/18 1731 Resulted by: Russell Obando MD    Performed: 03/12/18 1811 - 03/12/18 1818 Resulting lab: RADIOLOGY RESULTS    Narrative:       Exam: CT HEAD W/O CONTRAST    Clinical history:77 years Female fall;     Comparisons:None    Technique: Axial CT imaging of the head was performed Without  intervenous contrast.    FINDINGS:   Ventricles and sulci are symmetric. The gray-white matter  differentiation throughout the brain is well maintained. There is  moderate periventricular white matter change of chronic small vessel  ischemic disease. There is no evidence of intracranial mass or  hemorrhage. Visualized portions of the paranasal sinuses and mastoid  air cells are well aerated. There is no evidence of skull fracture.    There is a posterior left para midline scalp laceration.      Impression:       IMPRESSION: Posterior left para  midline scalp wound. There is no  evidence of intracranial hemorrhage or skull fracture.    MATTIE CHARLES MD      Testing Performed By     Lab - Abbreviation Name Director Address Valid Date Range    104 - Rad [facility] RADIOLOGY RESULTS Unknown Unknown 02/16/05 1553 - Present            Encounter-Level Documents:     There are no encounter-level documents.      Order-Level Documents:     There are no order-level documents.

## 2018-03-12 NOTE — IP AVS SNAPSHOT
[facility]    [address] [address]    Phone:  [phone]    Fax:  [phone]                                       After Visit Summary   3/12/2018    Gina Beal    MRN: [id number]           After Visit Summary Signature Page     I have received my discharge instructions, and my questions have been answered. I have discussed any challenges I see with this plan with the nurse or doctor.    ..........................................................................................................................................  Patient/Patient Representative Signature      ..........................................................................................................................................  Patient Representative Print Name and Relationship to Patient    ..................................................               ................................................  Date                                            Time    ..........................................................................................................................................  Reviewed by Signature/Title    ...................................................              ..............................................  Date                                                            Time

## 2018-03-12 NOTE — IP AVS SNAPSHOT
MRN:[id number]                      After Visit Summary   3/12/2018    Gina Beal    MRN: [id number]           Thank you!     Thank you for choosing [facility] for your care. Our goal is always to provide you with excellent care. Hearing back from our patients is one way we can continue to improve our services. Please take a few minutes to complete the written survey that you may receive in the mail after you visit with us. Thank you!        Patient Information     Date Of Birth          1940        Designated Caregiver       Most Recent Value    Caregiver    Will someone help with your care after discharge? yes    Name of designated caregiver Marina    Phone number of caregiver [phone]    Caregiver address [place]      About your hospital stay     You were admitted on:  March 12, 2018 You last received care in the:  [facility]    You were discharged on:  March 14, 2018       Who to Call     For medical emergencies, please call 911.  For non-urgent questions about your medical care, please call your primary care provider or clinic, [phone]          Attending Provider     Provider Specialty    Tomasa Coppola MD [facility] Internal Medicine    [facility] balderas MD Internal Medicine       Primary Care Provider Office Phone # Fax #    Garrett Amaro [phone]      After Care Instructions     Activity       Your activity upon discharge: activity as tolerated            Diet       Follow this diet upon discharge: Orders Placed This Encounter      Regular Diet Adult                  Follow-up Appointments     Follow-up and recommended labs and tests        Follow up with primary care provider, Garrett Amaro, within 7 days for hospital follow- up.  The following labs/tests are recommended: CBC.  Mammogram.  Needs to have Endoscopy and Colonoscopy set up as outpatient also through Dr Amaro.  Can have with Dr Barahona who saw  patient here in hospital or with the Surgeon/GI Doc of their choice.    Needs to have staples taken out at appointment with Dr Amaro also from scalp laceration.    Please make appointment with Dr Lott [facility] either in [place] or [place] for diagnosis of pancytopenia.  Please try and get for the next 2 weeks.                  Further instructions from your care team       What to expect when you have contrast    During your exam, we will inject  contrast  into your vein or artery. (Contrast is a clear liquid with iodine in it. It shows up on X-rays.)    You may feel warm or hot. You may have a metal taste in your mouth and a slight upset stomach. You may also feel pressure near the kidneys and bladder. These effects will last about 1 to 3 minutes.    Please tell us if you have:    Sneezing     Itching    Hives     Swelling in the face    A hoarse voice    Breathing problems    Other new symptoms    Serious problems are rare.  They may include:    Irregular heartbeat     Seizures    Kidney failure              Tissue damage    Shock      Death    If you have any problems during the exam, we  will treat them right away.    When you get home    Call your hospital if you have any new symptoms in the next 2 days, like hives or swelling. (Phone numbers are at the bottom of this page.) Or call your family doctor.     If you have wheezing or trouble breathing, call 911.    Self-care  -Drink at least 4 extra glasses of water today.   This reduces the stress on your kidneys.  -Keep taking your regular medicines.    The contrast will pass out of your body in your  Urine(pee). This will happen in the next 24 hours. You  will not feel this. Your urine will not  change color.    If you have kidney problems or take metformin    Drink 4 to 8 large glasses of water for the next  2 days, if you are not on a fluid restriction.    ?If you take metformin (Glucophage or Glucovance) for diabetes, keep taking it.      ?Your  kidney function tests are abnormal.  If you take Metformin, do not take it for 48 hours. Please go to your clinic for a blood test within 3 days after your exam before the restarting this medicine.     (Note to provider:please give patient prescription for lab tests.)    ?Special instructions:     I have read and understand the above information.    Patient Sign Here:______________________________________Date:________Time:______    Staff Sign Here:________________________________________Date:_______Time:______      Radiology Departments:     ?[facility]: [phone] ?[facility]: [phone]     ?[place]: [phone] ?[facility]:[phone]      ?[facility]: [phone]  ?[facility]: [phone]  ?[facility]:[phone]    ?[facility]:[phone]  ?[facility]:[phone]      You have a follow up appointment scheduled with Dr. Amaro on Tuesday March 20, 2018 at 11:30am at the [facility]. You will need to set up an appointment through Dr. Amaro at this appointment for an endoscopy/ colonoscopy, have your staples removed and have a lab appointment to recheck your CBC.If you have any questions regarding the appointment please call the clinic at [phone].    You have an appointment scheduled with Dr. Lott on Thursday March 22, 2018 at 8:30am at the [address]. The address to this clinic is [address] [place] [address] and the telephone number is [phone] if you have any questions regarding this appointment.      Pending Results     Date and Time Order Name Status Description    3/12/2018 2033 Haptoglobin In process             Statement of Approval     Ordered          03/14/18 0726  I have reviewed and agree with all the recommendations and orders detailed in this document.  EFFECTIVE NOW     Approved and electronically signed by:  Aman Phipps MD             Admission Information     Date & Time Provider Department Dept. Phone    3/12/2018 Aman Phipps,  "MD [facility] [phone]      Your Vitals Were     Blood Pressure Pulse Temperature Respirations Height Weight    145/61 71 98.1  F (36.7  C) (Tympanic) 16 1.524 m (5') 63.5 kg (139 lb 15.9 oz)    Pulse Oximetry BMI (Body Mass Index)                92% 27.34 kg/m2          [employer] Information     [email or web address] lets you send messages to your doctor, view your test results, renew your prescriptions, schedule appointments and more. To sign up, go to [email or web address] . Click on \"Log in\" on the left side of the screen, which will take you to the Welcome page. Then click on \"Sign up Now\" on the right side of the page.     You will be asked to enter the access code listed below, as well as some personal information. Please follow the directions to create your username and password.     Your access code is: [email or web address]  Expires: 2018  7:43 AM     Your access code will  in 90 days. If you need help or a new code, please call your [facility] clinic or [phone].        Care EveryWhere ID     This is your Care EveryWhere ID. This could be used by other organizations to access your [facility] medical records  [id number]        Equal Access to Services     ELVA GIRON AH: Kim candelaria Solamar, waaxda luqadaha, qaybta kaalmada adeegyada, annamarie esparza. So [facility] [phone].    ATENCIÓN: Si habla español, tiene a de dios disposición servicios gratuitos de asistencia lingüística. Llame al [phone].    We comply with applicable federal civil rights laws and [place] laws. We do not discriminate on the basis of race, color, national origin, age, disability, sex, sexual orientation, or gender identity.               Review of your medicines      START taking        Dose / Directions    ferrous sulfate 325 (65 FE) MG tablet   Commonly known as:  IRON        Dose:  325 mg   Take 1 tablet (325 mg) by mouth 2 times daily   Quantity:  100 tablet   Refills:  0       levofloxacin " 250 MG tablet   Commonly known as:  LEVAQUIN   Indication:  Urinary Tract Infection   Used for:  Acute cystitis without hematuria        Dose:  250 mg   Take 1 tablet (250 mg) by mouth daily for 2 days   Quantity:  2 tablet   Refills:  0         CONTINUE these medicines which have NOT CHANGED        Dose / Directions    HYDROcodone-acetaminophen 5-325 MG per tablet   Commonly known as:  NORCO        Dose:  1 tablet   Take 1 tablet by mouth every 6 hours as needed   Refills:  0       LASIX PO        Dose:  40 mg   Take 40 mg by mouth daily   Refills:  0       LORAZEPAM PO        Take by mouth daily   Refills:  0       METFORMIN HCL PO        Dose:  500 mg   Take 500 mg by mouth daily   Refills:  0       potassium chloride sveta er   Commonly known as:  K-DUR   Indication:  Low Amount of Potassium in the Blood        Dose:  10 mEq   Take 10 mEq by mouth once   Refills:  0       PRILOSEC PO        Dose:  20 mg   Take 20 mg by mouth 2 times daily (before meals)   Refills:  0       SYNTHROID PO        Dose:  50 mcg   Take 50 mcg by mouth daily   Refills:  0       venlafaxine 75 MG 24 hr capsule   Commonly known as:  EFFEXOR-XR        Dose:  75 mg   Take 75 mg by mouth daily   Refills:  0       ZOCOR PO        Dose:  20 mg   Take 20 mg by mouth At Bedtime   Refills:  0         STOP taking     HYDROCHLOROTHIAZIDE PO                Where to get your medicines      These medications were sent to [id number] - [place] - [address]  [address] [place] [address]     Phone:  [phone]     ferrous sulfate 325 (65 FE) MG tablet    levofloxacin 250 MG tablet                Protect others around you: Learn how to safely use, store and throw away your medicines at www.disposemymeds.org.        ANTIBIOTIC INSTRUCTION     You've Been Prescribed an Antibiotic - Now What?  Your healthcare team thinks that you or your loved one might have an infection. Some infections can be treated with antibiotics, which  are powerful, life-saving drugs. Like all medications, antibiotics have side effects and should only be used when necessary. There are some important things you should know about your antibiotic treatment.      Your healthcare team may run tests before you start taking an antibiotic.    Your team may take samples (e.g., from your blood, urine or other areas) to run tests to look for bacteria. These test can be important to determine if you need an antibiotic at all and, if you do, which antibiotic will work best.      Within a few days, your healthcare team might change or even stop your antibiotic.    Your team may start you on an antibiotic while they are working to find out what is making you sick.    Your team might change your antibiotic because test results show that a different antibiotic would be better to treat your infection.    In some cases, once your team has more information, they learn that you do not need an antibiotic at all. They may find out that you don't have an infection, or that the antibiotic you're taking won't work against your infection. For example, an infection caused by a virus can't be treated with antibiotics. Staying on an antibiotic when you don't need it is more likely to be harmful than helpful.      You may experience side effects from your antibiotic.    Like all medications, antibiotics have side effects. Some of these can be serious.    Let you healthcare team know if you have any known allergies when you are admitted to the hospital.    One significant side effect of nearly all antibiotics is the risk of severe and sometimes deadly diarrhea caused by Clostridium difficile (C. Difficile). This occurs when a person takes antibiotics because some good germs are destroyed. Antibiotic use allows C. diificile to take over, putting patients at high risk for this serious infection.    As a patient or caregiver, it is important to understand your or your loved one's antibiotic  treatment. It is especially important for caregivers to speak up when patients can't speak for themselves. Here are some important questions to ask your healthcare team.    What infection is this antibiotic treating and how do you know I have that infection?    What side effects might occur from this antibiotic?    How long will I need to take this antibiotic?    Is it safe to take this antibiotic with other medications or supplements (e.g., vitamins) that I am taking?     Are there any special directions I need to know about taking this antibiotic? For example, should I take it with food?    How will I be monitored to know whether my infection is responding to the antibiotic?    What tests may help to make sure the right antibiotic is prescribed for me?      Information provided by:  www.cdc.gov/getsmart  U.S. Department of Health and Human Services  Centers for disease Control and Prevention  National Center for Emerging and Zoonotic Infectious Diseases  Division of Healthcare Quality Promotion             Medication List: This is a list of all your medications and when to take them. Check marks below indicate your daily home schedule. Keep this list as a reference.      Medications           Morning Afternoon Evening Bedtime As Needed    ferrous sulfate 325 (65 FE) MG tablet   Commonly known as:  IRON   Take 1 tablet (325 mg) by mouth 2 times daily   Last time this was given:  325 mg on 3/14/2018  7:57 AM                                HYDROcodone-acetaminophen 5-325 MG per tablet   Commonly known as:  NORCO   Take 1 tablet by mouth every 6 hours as needed   Last time this was given:  1 tablet on 3/14/2018  5:48 AM                                LASIX PO   Take 40 mg by mouth daily                                levofloxacin 250 MG tablet   Commonly known as:  LEVAQUIN   Take 1 tablet (250 mg) by mouth daily for 2 days   Last time this was given:  250 mg on 3/13/2018  9:20 PM                                 LORAZEPAM PO   Take by mouth daily                                METFORMIN HCL PO   Take 500 mg by mouth daily                                potassium chloride sveta er   Commonly known as:  K-DUR   Take 10 mEq by mouth once   Last time this was given:  40 mEq on 3/14/2018  7:58 AM                                PRILOSEC PO   Take 20 mg by mouth 2 times daily (before meals)   Last time this was given:  20 mg on 3/14/2018  5:48 AM                                SYNTHROID PO   Take 50 mcg by mouth daily   Last time this was given:  50 mcg on 3/14/2018  5:48 AM                                venlafaxine 75 MG 24 hr capsule   Commonly known as:  EFFEXOR-XR   Take 75 mg by mouth daily   Last time this was given:  75 mg on 3/14/2018  7:57 AM                                ZOCOR PO   Take 20 mg by mouth At Bedtime   Last time this was given:  20 mg on 3/13/2018  9:14 PM                                          More Information        Uncertain Causes of Fall  You have had a fall today. But the cause of your fall is not certain. Falls can occur due to slipping, tripping or losing your balance. A fall can also occur from a fainting spell or seizure.  While a fall can happen for a simple reason (tripping over something), falls in elderly people are often caused by a combination of things:    Age-related decline in function with worsening balance, stability, vision, and muscle strength    Chronic illness such as heart arrhythmias, heart valve disease, vascular disease, COPD, diabetes, strokes, arthritis    Effects or side effects of medicines    Dehydration.    Environmental hazards such as uneven or slippery ground, unfamiliar place, obstacles, uneven surfaces, or slippery ground    Situational factors (related to the activity being done, e.g., rushing to the bathroom)  Because the cause of your fall today is not certain, it is possible that a fainting spell or seizure was the cause. This means that it could happen again,  without warning. If you fall again, without a cause, then you should return to this facility promptly to have further tests. Otherwise, follow up with your doctor as explained below.  It is normal to feel sore and tight in your muscles and back the next day, and not just the muscles you initially injured. Remember, all the parts of your body are connected, so while initially one area hurts, the next day another may hurt. Also, when you injure yourself, it causes inflammation, which then causes the muscles to tighten up and hurt more. After the initial worsening, it should gradually improve over the next few days. However, more severe pain should be reported.  Even without a definite head injury, you can still get a concussion. Concussions and even bleeding can still occur, especially if you have had a recent injury or take blood thinner medicine. It is not unusual to have a mild headache and feel tired and even nauseous or dizzy.  Home care    Rest today and resume your normal activities as soon as you are feeling back to normal. It is best to remain with someone who can check on you for the next 24 hours to watch for another episode of falling.    If you were injured during the fall, follow the advice from your doctor regarding care of your injury.     If you become light-headed or dizzy, lie down immediately or sit and lean forward with your head down.    As a precaution, do not drive a car or operate dangerous equipment, do not take a bath alone (use a shower instead) and do not swim alone until you see your doctor. A condition causing fainting or seizures must be ruled out before resuming these activities.    You may use acetaminophen or ibuprofen to control pain, unless another pain medicine was prescribed. If you have chronic liver or kidney disease or ever had a stomach ulcer or gastrointestinal bleeding, talk with your doctor before using these medicines.    Keep your appointments for any further testing  that may have been scheduled for you.  Follow-up care  Follow up with your healthcare provider, or as advised.  If X-rays or CT scan were done, you will be notified if there is a change in the reading, especially if it affects treatment.  Call 911  Call 911 if any of these occur:    Trouble breathing    Confused or difficulty arousing    Fainting or loss of consciousness    Rapid or very slow heart rate    Seizure    Difficulty with speech or vision, weakness of an arm or leg    Difficulty walking or talking, loss of balance, numbness or weakness in one side of your body, facial droop  When to seek medical advice  Call your healthcare provider right away if any of these occur:    Another unexplained fall    Dizziness    Severe headache    Nausea and vomiting    Blood in vomit, stools (black or red color)  Date Last Reviewed: 11/5/2015 2000-2017 [employer]. [address] [place] [address]. All rights reserved. This information is not intended as a substitute for professional medical care. Always follow your healthcare professional's instructions.                Fall Prevention  Falls often occur due to slipping, tripping or losing your balance. Millions of people fall every year and injure themselves. Here are ways to reduce your risk of falling again.    Think about your fall, was there anything that caused your fall that can be fixed, removed, or replaced?    Make your home safe by keeping walkways clear of objects you may trip over.    Use non-slip pads under rugs. Do not use area rugs or small throw rugs.    Use non-slip mats in bathtubs and showers.    Install handrails and lights on staircases.    Do not walk in poorly lit areas.    Do not stand on chairs or wobbly ladders.    Use caution when reaching overhead or looking upward. This position can cause a loss of balance.    Be sure your shoes fit properly, have non-slip bottoms and are in good condition.     Wear shoes both inside  and out. Avoid going barefoot or wearing slippers.    Be cautious when going up and down stairs, curbs, and when walking on uneven sidewalks.    If your balance is poor, consider using a cane or walker.    If your fall was related to alcohol use, stop or limit alcohol intake.     If your fall was related to use of sleeping medicines, talk to your doctor about this. You may need to reduce your dosage at bedtime if you awaken during the night to go to the bathroom.      To reduce the need for nighttime bathroom trips:    Avoid drinking fluids for several hours before going to bed    Empty your bladder before going to bed    Men can keep a urinal at the bedside    Stay as active as you can. Balance, flexibility, strength, and endurance all come from exercise. They all play a role in preventing falls. Ask your healthcare provider which types of activity are right for you.    Get your vision checked on a regular basis.    If you have pets, know where they are before you stand up or walk so you don't trip over them.    Use night lights.  Date Last Reviewed: 11/5/2015 2000-2017 [employer]. [address] [place] [address]. All rights reserved. This information is not intended as a substitute for professional medical care. Always follow your healthcare professional's instructions.                Preventing Falls in the Home  An adult or child can fall for many reasons. If you are an older adult, you may fall because your reaction time slows down. Your muscles and joints may get stiff, weak, or less flexible because of illness, medicines, or a physical condition. These things can also make a child more likely to fall or be injured in a fall.  Other health problems that make falls more likely include:    Arthritis    Dizziness or lightheadedness when you get out of bed (orthostatic hypotension)    History of a stroke    Dizziness    Anemia    Certain medicines taken for mental illness    Problems  with balance or gait    History of falls with or without an injury    Changes in vision (vision impairment)    Changes in thinking skills and memory (cognitive impairment)  Injuries from a fall can include broken bones, dislocated joints, and cuts. When these injuries are serious enough, they can make it impossible for you or a child who is injured in a fall to live on his or her own.  Prevention tips  To help prevent falls and fall-related injuries, follow the tips below.   Floors  Make floors safer by doing the following:     Put nonskid pads under area rugs.    Remove throw rugs.    Replace worn floor coverings.    Tack carpets firmly to each step on carpeted stairs. Put nonskid strips on the edges of uncarpeted stairs.    Keep floors and stairs free of clutter and cords.    Arrange furniture so there are clear pathways.    Clean up any spills right away.    Wear shoes that fit.  Bathrooms    Make bathrooms safer by doing the following:     Install grab bars in the tub or shower.    Apply nonskid strips or put a nonskid rubber mat in the tub or shower.    Sit on a bath chair to bathe.    Use bathmats with nonskid backing.  Lighting and the environment  Improve lighting in your home by doing the following:     Keep a flashlight in each room. Or put a lamp next to the bed within easy reach.    Put nightlights in the bedrooms, hallways, kitchen, and bathrooms.    Make sure all stairways have good lighting.    Take your time when going up and down stairs.    Put handrails on both sides of stairs and in walkways for more support. To prevent injury to your wrist or arm, don t use handrails to pull yourself up.    Install grab bars to pull yourself up.    Move or rearrange items that you use often. This will make them easier to find or reach.    Look at your home to find any safety hazards. Especially look at doorways, walkways, and the driveway. Remove or repair any safety problems that you find.  Date Last Reviewed:  8/1/2016 2000-2017 [employer]. [address] [place] [address], [place] [address]. All rights reserved. This information is not intended as a substitute for professional medical care. Always follow your healthcare professional's instructions.                Scalp Laceration: Sutures or Staples  A laceration is a cut through the skin. A scalp laceration may require stitches (sutures) or staples. There are a lot of blood vessels in the scalp. Because of this, significant bleeding is common with scalp cuts.  Home care  The following guidelines will help you care for your laceration at home:    During the first 2 days you may carefully rinse your hair in the shower to remove blood and glass or dirt particles. After 2 days you may shower and shampoo your hair normally.    Have someone help you clean your wound every day:    In the shower, wash the area with soap and water. Use a wet cotton swab to loosen and remove any blood or crust that forms.    After cleaning, keep the wound clean and dry. Talk with your doctor about applying antibiotic ointment to the wound. Apply a fresh bandage.    Don't put your head underwater until the stitches or staples have been removed. This means no swimming.    Your doctor may prescribe an antibiotic cream or ointment to prevent infection. Do not stop taking this medication until you have finished the prescribed course or your doctor tells you to stop.    Your doctor may prescribe medications for pain. If no pain medicines were prescribed, you can use over-the-counter pain medicines. Follow instructions for taking these medications. Talk with your doctor before using these medicines if you have chronic liver or kidney disease. Also talk with your doctor if you have ever had a stomach ulcer or GI bleeding.  Follow-up care  Follow up with your healthcare provider, or as advised. Check the wound daily for the signs of infection listed below. Stitches or staples are usually removed  from the scalp in about 7 to 14 days.  Call 911  Call 911 if any of these occur:    Bleeding can't be controlled by direct pressure  When to seek medical advice  Call your healthcare provider right away if any of these occur:    Signs of infection, including increasing pain in the wound, redness, swelling, or pus coming from the wound    Fever of 100.4 F (38 C) or higher, or as directed by your healthcare provider    Stitches or staples come apart or fall out before your next appointment    Wound edges re-open  Date Last Reviewed: 10/1/2016    0771-2108 [employer]. [address] [place] [address]. All rights reserved. This information is not intended as a substitute for professional medical care. Always follow your healthcare professional's instructions.                Anemia  Anemia is a condition that occurs when your body does not have enough healthy red blood cells (RBCs). RBCs are the parts of your blood that carry oxygen throughout your body. A protein called hemoglobin allows your RBCs to absorb and release oxygen. Without enough RBCs or hemoglobin, your body doesn't get enough oxygen. Symptoms of anemia may then occur.    What are the symptoms of anemia?  Some people with anemia have no symptoms. But most people have symptoms that range from mild to severe. These can include:    Tiredness (fatigue)    Weakness    Pale skin    Shortness of breath    Dizziness or fainting    Rapid heartbeat    Trouble doing normal amounts of activity    Jaundice (yellowing of your eyes, skin, or mouth; dark urine)  What causes anemia?  Anemia can occur when your body:    Loses too much blood    Does not make enough RBCs    Destroys your RBCs at a faster rate than it can replace them    Does not make a normal amount of hemoglobin in your RBCs  These problems can occur for many reasons, including:    A condition that you are born with (congenital or inherited), such as sickle cell disease or  thalassemia    Heavy bleeding for any reason, including injury, surgery, childbirth, or even heavy menstrual periods    Being low in certain nutrients, such as iron, folate, or vitamin B12, possibly from a poor diet or a condition like celiac disease or Crohn's disease    Certain chronic conditions like diabetes, arthritis, or kidney disease    Certain chronic infections like tuberculosis or HIV    Exposure to certain medicines, such as those used for chemotherapy  There are different types of anemia. Your healthcare provider can tell you more about the type of anemia you have and what may have caused it.  How is anemia diagnosed?  To diagnose anemia, your healthcare provider orders blood tests. These can include:    Complete blood cell count (CBC). This test measures the amounts of the different types of blood cells.    Blood smear. This test checks the size and shape of your blood cells. To do the test, a drop of your blood is viewed under a microscope. A stain is used to make the blood cells easier to see.    Iron studies. These tests measure the amount of iron in your blood. Your body needs iron to make hemoglobin in your RBCs.    Vitamin B12 and folate studies. These tests check for some of the components that help give RBCs a normal size and shape.    Reticulocyte count. This test measures the amount of new RBCs that your bone marrow makes.    Hemoglobin electrophoresis. This test checks for problems with your hemoglobin in RBCs.  How is anemia treated?  Treatment for anemia is based on the type of anemia, its cause, and the severity of your symptoms. Treatments may include:    Diet changes. This involves increasing the amount of certain nutrients in your diet, such as iron, vitamin B12, or folate. Your healthcare provider may also prescribe nutrient supplements.    Medicines. Certain medicines treat the cause of your anemia. Others help build new RBCs or relieve symptoms. If a medicine is the cause of your  anemia, you may need to stop or change it.    Blood transfusions. Replacing some of your blood can increase the number of healthy RBCs in your body.    Surgery. In some cases, your doctor may do surgery to treat the underlying cause of anemia. If you need surgery, your healthcare provider will explain the procedure and outline the risks and benefits for you.  What are the long-term concerns?  If you have a certain type of anemia, you can expect a full recovery after treatment. If you have other types of anemia (especially a type you're born with), you will need to manage it for life. Your doctor can tell you more.  Date Last Reviewed: 12/1/2016 2000-2017 [employer]. [address] [place] [address], [place] [address]. All rights reserved. This information is not intended as a substitute for professional medical care. Always follow your healthcare professional's instructions.                Patient Education    Ascorbic Acid (Vitamin C), Ferrous Sulfate Oral tablet, extended-release    Carbonyl Iron Chewable tablet    Carbonyl Iron Oral suspension    Ferrous Fumarate Oral capsule, liquid filled    Ferrous Fumarate Oral tablet    Ferrous Fumarate Oral tablet, extended-release    Ferrous Fumarate, Polysaccharide-Iron Complex Oral capsule    Ferrous Gluconate Oral tablet    Ferrous Sulfate Elixir    Ferrous Sulfate Gastro-resistant tablet    Ferrous Sulfate Oral capsule, extended-release    Ferrous Sulfate Oral drops, solution    Ferrous Sulfate Oral solution    Ferrous Sulfate Oral tablet    Ferrous Sulfate Oral tablet, extended-release    Iron Oral drops, solution    Iron Oral tablet    Iron Oral tablet, extended-release    Polysaccharide-Iron Complex, Heme Iron Polypeptide Oral tablet  Iron Oral tablet  What is this medicine?  IRON (AHY carlitos) replaces iron that is essential to healthy red blood cells. Iron is used to treat iron deficiency anemia. Anemia may cause problems like tiredness, shortness of breath,  or slowed growth in children. Only take iron if your doctor has told you to. Do not treat yourself with iron if you are feeling tired. Most healthy people get enough iron in their diets, particularly if they eat cereals, meat, poultry, and fish.  This medicine may be used for other purposes; ask your health care provider or pharmacist if you have questions.  What should I tell my health care provider before I take this medicine?  They need to know if you have any of these conditions:    frequently drink alcohol    bowel disease    hemolytic anemia    iron overload (hemochromatosis, hemosiderosis)    liver disease    problems with swallowing    stomach ulcer or other stomach problems    an unusual or allergic reaction to iron, other medicines, foods, dyes, or preservatives    pregnant or trying to get pregnant    breast-feeding  How should I use this medicine?  Take this medicine by mouth with a glass of water or fruit juice. Follow the directions on the prescription label. Swallow whole. Do not crush or chew. Take this medicine in an upright or sitting position. Try to take any bedtime doses at least 10 minutes before lying down. You may take this medicine with food. Take your medicine at regular intervals. Do not take your medicine more often than directed. Do not stop taking except on your doctor's advice.  Talk to your pediatrician regarding the use of this medicine in children. While this drug may be prescribed for selected conditions, precautions do apply.  Overdosage: If you think you have taken too much of this medicine contact a poison control center or emergency room at once.  NOTE: This medicine is only for you. Do not share this medicine with others.  What if I miss a dose?  If you miss a dose, take it as soon as you can. If it is almost time for your next dose, take only that dose. Do not take double or extra doses.  What may interact with this medicine?  If you are taking this iron product, you should  not take iron in any other medicine or dietary supplement.  This medicine may also interact with the following medications:    alendronate    antacids    cefdinir    chloramphenicol    cholestyramine    deferoxamine    dimercaprol    etidronate    medicines for stomach ulcers or other stomach problems    pancreatic enzymes    quinolone antibiotics (examples: Cipro, Floxin, Levaquin, Tequin and others)    risedronate    tetracycline antibiotics (examples: doxycycline, tetracycline, minocycline, and others)    thyroid hormones  This list may not describe all possible interactions. Give your health care provider a list of all the medicines, herbs, non-prescription drugs, or dietary supplements you use. Also tell them if you smoke, drink alcohol, or use illegal drugs. Some items may interact with your medicine.  What should I watch for while using this medicine?  Use iron supplements only as directed by your health care professional. You will need important blood work while you are taking this medicine. It may take 3 to 6 months of therapy to treat low iron levels. Pregnant women should follow the dose and length of iron treatment as directed by their doctors.  Do not use iron longer than prescribed, and do not take a higher dose than recommended. Long-term use may cause excess iron to build-up in the body.  Do not take iron with antacids. If you need to take an antacid, take it 2 hours after a dose of iron.  What side effects may I notice from receiving this medicine?  Side effects that you should report to your doctor or health care professional as soon as possible:    allergic reactions like skin rash, itching or hives, swelling of the face, lips, or tongue    blue lips, nails, or palms    dark colored stools (this may be due to the iron, but can indicate a more serious condition)    drowsiness    pain with or difficulty swallowing    pale or clammy skin    seizures    stomach pain    unusually weak or  tired    vomiting    weak, fast, or irregular heartbeat  Side effects that usually do not require medical attention (report to your doctor or health care professional if they continue or are bothersome):    constipation    indigestion    nausea or stomach upset  This list may not describe all possible side effects. Call your doctor for medical advice about side effects. You may report side effects to FDA at 5-636-JLQ-0610.  Where should I keep my medicine?  Keep out of the reach of children. Even small amounts of iron can be harmful to a child.  Store at room temperature between 15 and 30 degrees C (59 and 86 degrees F). Keep container tightly closed. Throw away any unused medicine after the expiration date.  NOTE:This sheet is a summary. It may not cover all possible information. If you have questions about this medicine, talk to your doctor, pharmacist, or health care provider. Copyright  2016 [employer]                Patient Education    Levofloxacin Ophthalmic drops, solution    Levofloxacin Oral solution    Levofloxacin Oral tablet    Levofloxacin Solution for injection    Levofloxacin, Dextrose Solution for injection  Levofloxacin Oral tablet  What is this medicine?  LEVOFLOXACIN (nehal voe FLOX a sin) is a quinolone antibiotic. It is used to treat certain kinds of bacterial infections. It will not work for colds, flu, or other viral infections.  This medicine may be used for other purposes; ask your health care provider or pharmacist if you have questions.  What should I tell my health care provider before I take this medicine?  They need to know if you have any of these conditions:    cerebral disease    irregular heartbeat    kidney disease    seizure disorder    an unusual or allergic reaction to levofloxacin, other antibiotics or medicines, foods, dyes, or preservatives    pregnant or trying to get pregnant    breast-feeding  How should I use this medicine?  Take this medicine by mouth with a full glass  of water. Follow the directions on the prescription label. This medicine can be taken with or without food. Take your medicine at regular intervals. Do not take your medicine more often than directed. Do not skip doses or stop your medicine early even if you feel better. Do not stop taking except on your doctor's advice.  A special MedGuide will be given to you by the pharmacist with each prescription and refill. Be sure to read this information carefully each time.  Talk to your pediatrician regarding the use of this medicine in children. While this drug may be prescribed for children as young as 6 months for selected conditions, precautions do apply.  Overdosage: If you think you have taken too much of this medicine contact a poison control center or emergency room at once.  NOTE: This medicine is only for you. Do not share this medicine with others.  What if I miss a dose?  If you miss a dose, take it as soon as you remember. If it is almost time for your next dose, take only that dose. Do not take double or extra doses.  What may interact with this medicine?  Do not take this medicine with any of the following medications:    arsenic trioxide    chloroquine    droperidol    medicines for irregular heart rhythm like amiodarone, disopyramide, dofetilide, flecainide, quinidine, procainamide, sotalol    some medicines for depression or mental problems like phenothiazines, pimozide, and ziprasidone  This medicine may also interact with the following medications:    amoxapine    antacids    cisapride    dairy products    didanosine (ddI) buffered tablets or powder    haloperidol    multivitamins    NSAIDS, medicines for pain and inflammation, like ibuprofen or naproxen    retinoid products like tretinoin or isotretinoin    risperidone    some other antibiotics like clarithromycin or erythromycin    sucralfate    theophylline    warfarin  This list may not describe all possible interactions. Give your health care  provider a list of all the medicines, herbs, non-prescription drugs, or dietary supplements you use. Also tell them if you smoke, drink alcohol, or use illegal drugs. Some items may interact with your medicine.  What should I watch for while using this medicine?  Tell your doctor or health care professional if your symptoms do not improve or if they get worse. Drink several glasses of water a day and cut down on drinks that contain caffeine. You must not get dehydrated while taking this medicine.  You may get drowsy or dizzy. Do not drive, use machinery, or do anything that needs mental alertness until you know how this medicine affects you. Do not sit or stand up quickly, especially if you are an older patient. This reduces the risk of dizzy or fainting spells.  This medicine can make you more sensitive to the sun. Keep out of the sun. If you cannot avoid being in the sun, wear protective clothing and use a sunscreen. Do not use sun lamps or tanning beds/booths. Contact your doctor if you get a sunburn.  If you are a diabetic monitor your blood glucose carefully. If you get an unusual reading stop taking this medicine and call your doctor right away.  Do not treat diarrhea with over-the-counter products. Contact your doctor if you have diarrhea that lasts more than 2 days or if the diarrhea is severe and watery.  Avoid antacids, calcium, iron, and zinc products for 2 hours before and 2 hours after taking a dose of this medicine.  What side effects may I notice from receiving this medicine?  Side effects that you should report to your doctor or health care professional as soon as possible:    allergic reactions like skin rash or hives, swelling of the face, lips, or tongue    changes in vision    confusion, nightmares or hallucinations    difficulty breathing    irregular heartbeat, chest pain    joint, muscle or tendon pain    pain or difficulty passing urine    persistent headache with or without blurred  vision    redness, blistering, peeling or loosening of the skin, including inside the mouth    seizures    unusual pain, numbness, tingling, or weakness    vaginal irritation, discharge  Side effects that usually do not require medical attention (report to your doctor or health care professional if they continue or are bothersome):    diarrhea    dry mouth    headache    stomach upset, nausea    trouble sleeping  This list may not describe all possible side effects. Call your doctor for medical advice about side effects. You may report side effects to FDA at 2-978-IYF-7171.  Where should I keep my medicine?  Keep out of the reach of children.  Store at room temperature between 15 and 30 degrees C (59 and 86 degrees F). Keep in a tightly closed container. Throw away any unused medicine after the expiration date.  NOTE:This sheet is a summary. It may not cover all possible information. If you have questions about this medicine, talk to your doctor, pharmacist, or health care provider. Copyright  2016 [employer]

## 2018-03-12 NOTE — IP AVS SNAPSHOT
` `     [facility]: [phone]                 INTERAGENCY TRANSFER FORM - NOTES (H&P, Discharge Summary, Consults, Procedures, Therapies)   3/12/2018                    Hospital Admission Date: 3/12/2018  GINA ORTIZ   : 1940  Sex: Female        Patient PCP Information     Provider PCP Type    Garrett Amaro MD General         History & Physicals      H&P by Kassy Ayala MD at 3/12/2018  8:48 PM     Author:  Kassy Ayala MD Service:  Hospitalist Author Type:  Physician    Filed:  3/12/2018  9:34 PM Date of Service:  3/12/2018  8:48 PM Creation Time:  3/12/2018  8:48 PM    Status:  Signed :  Kassy Ayala MD (Physician)         [facility]    History and Physical  Hospitalist       Date of Admission:  3/12/2018    Assessment & Plan   Gina Ortiz is a 77 year old female who presents with syncope, fall, scalp laceration and found with pancytopenia. Admitted for transfusion, syncope follow up.    Active Problems:   Pancytopenia    Assessment: this is part pancytopenia. She denies and -ve on exam blood loosing features, except RLQ pain with some obstipation    Plan: Will as pathology peripheral smear to look for dysplastic features (pseudo Pelger-Huet anomaly or neutrophyl hypersegmentation. May need cytogenetic studies or bone marrow but this will be decided by heamologist    Symptomatic Anemia, most likely the cause of syncope  See above.   R/O GI blood loss. CT will be done    Syncope:  Assessment; most likely mechanical fall due to anemia. I could not get abn exam or worrisome feature on ROS.   Will keep her on tele and will check orthostatic VS      Hypokalemia    Assessment: most likely due to loop diuretics and HCTZ she is taking.     Plan: will replace. Will hold diuretics for now. Will rec not to restart HCTZ on Discharge    Depression    Assessment: due to family dynamics. Admits being depressed.     Plan: Continue antidepressants.        "Osteoarthritis    Assessment: mild-moderate, but she is on norco    Plan: will continue norco    Scalp laceration  - sutured in ED  - per admission protocol, trauma surgeon will be notified in AM (dr. Gaines is on call)  # Pain Assessment: pt takes norco for her chronic pain and this will be continued  Current Pain Score 3/12/2018 3/12/2018 3/12/2018   Patient currently in pain? denies - yes   Pain score (0-10) 0 2 6   Pain location - - Head   Pain descriptors - - Sore   - Gina is experiencing pain due to chronic pain due to osteoarthritis. Pain management was discussed and the plan was created in a collaborative fashion.  Gina's response to the current recommendations: engaged  compliant  - Opioid regimen: norco home dose  - Response to opioid medications: Reduction of symptoms of pain  - Bowel regimen: senna    DVT Prophylaxis: Pneumatic Compression Devices  Code Status: Full Code    Disposition: Expected discharge in 1-2 stable (activing bleeding r/o and anemia controlled.    [facility] in [place].     Primary Care Physician   Garrett Amaro    Chief Complaint   Fall, scalp laceration    RFA: fall, anemia requiring transfusion.     History is obtained from the patient, electronic health record, emergency department physician, patient's daughter and patient's spouse    History of Present Illness   Gina Beal is a 77 year old women with PMH of HTN,[AU1.1] Osteoarthritis, UTI, depression, was brought to ED after falling at home. She fell from the 2nd step on the step stool. Did not feel any chest pain, palpitation, dizziness immediately before fall, but did not fell \"right\" all day or even a few days prior to admission. At the time of falling, she was cleaning her knickknacks and cleaning them. She denies heal overextending or working with elevated hands. The next thing she remembers- waking on the floor and blood all over. She denies tongue biting or being confused. She does not know " "how long she was down. She admits intermittent abd pain (RLQ), denies stool color changes, dysuria, hematuria, hematochezia, taking large doses of NSAIDS. Occasionally she gets constipated. Her appetite is not good but she was never a \"big eater\". The rest ROS is -ve. 14 points obtained.[AU1.2]     Past Medical History    I have reviewed this patient's medical history and updated it with pertinent information if needed.   History reviewed. No pertinent past medical history.    Past Surgical History   I have reviewed this patient's surgical history and updated it with pertinent information if needed.  History reviewed. No pertinent surgical history.    Prior to Admission Medications   Prior to Admission Medications   Prescriptions Last Dose Informant Patient Reported? Taking?   Furosemide (LASIX PO) 3/12/2018 at 0800  Yes Yes   Sig: Take 40 mg by mouth daily   HYDROCHLOROTHIAZIDE PO Unknown at Unknown time  Yes No   Sig: Take by mouth daily   HYDROcodone-acetaminophen (NORCO) 5-325 MG per tablet 3/12/2018 at 0800  Yes Yes   Sig: Take 1 tablet by mouth every 6 hours as needed   LORAZEPAM PO Unknown at Unknown time  Yes No   Sig: Take by mouth daily   Levothyroxine Sodium (SYNTHROID PO) 3/12/2018 at 0700  Yes Yes   Sig: Take 50 mcg by mouth daily    METFORMIN HCL PO 3/12/2018 at 0800  Yes Yes   Sig: Take 500 mg by mouth daily    Omeprazole (PRILOSEC PO) 3/12/2018 at 0700  Yes Yes   Sig: Take 20 mg by mouth 2 times daily (before meals)   Simvastatin (ZOCOR PO) 3/11/2018 at 1900  Yes Yes   Sig: Take 20 mg by mouth At Bedtime    Venlafaxine HCl (EFFEXOR PO) 3/12/2018 at 0800  Yes Yes   Sig: Take 75 mg by mouth daily   potassium chloride sveta er (K-DUR) 3/11/2018 at 1900  Yes Yes   Sig: Take 10 mEq by mouth once      Facility-Administered Medications: None     Allergies   Allergies   Allergen Reactions     Aspirin      Flexeril [Cyclobenzaprine]      Iodine      Sulfa Drugs        Social History   I have reviewed this " patient's social history and updated it with pertinent information if needed. Gina Beal[AU1.1]     She admits being a smoker and quit 12 years ago[AU1.2]    Family History   I have reviewed this patient's family history and updated it with pertinent information if needed.   No family history on file.    Review of Systems[AU1.1]   14 points obtained. Pertinent +ves and -ves included in HPI. The rest is -ve.[AU1.2]     Physical Exam   Temp: 98  F (36.7  C) Temp src: Tympanic BP: 165/57 Pulse: 76 Heart Rate: 86 Resp: 16 SpO2: 96 % O2 Device: None (Room air)    Vital Signs with Ranges  Temp:  [97.7  F (36.5  C)-98  F (36.7  C)] 98  F (36.7  C)  Pulse:  [72-76] 76  Heart Rate:  [86] 86  Resp:  [16-18] 16  BP: (128-165)/(49-63) 165/57  SpO2:  [96 %-97 %] 96 %  139 lbs 15.87 oz[AU1.1]    Physical exam:   General: not in distress pleasant woment  Scalp lacerated. conjuntivae pale  Neck:no JVD, no bruits  Card: regular, no M/G/R. PMI not displaced  Lungs: clear bilaterally  Abd; soft, non-distended, not tender, no HSM, no pulsatile masses.   : bladder not palpable.   Neurological: non-focal motor and sensory. Delayed relaxation phase of DTR.   Skin: warm, dry, no rash  Psychiatric: A&O x 4, pleasant.[AU1.2]     Data   Data reviewed today:  I personally reviewed[AU1.1] the EKG tracing showing NSR and the XR and CT - no acute pathology image(s) showing no acute pathology[AU1.2].    Recent Labs  Lab 03/12/18  1744   WBC 2.6*   HGB 7.0*   MCV 63*   *   INR 1.12      POTASSIUM 3.2*   CHLORIDE 107   CO2 25   BUN 13   CR 0.88   ANIONGAP 9   CIPRIANO 8.2*   *   ALBUMIN 3.6   PROTTOTAL 7.4   BILITOTAL 0.7   ALKPHOS 150   ALT 26   AST 35   TROPI <0.015       No results found for this or any previous visit (from the past 24 hour(s)).[AU1.1]       Revision History        User Key Date/Time User Provider Type Action    > AU1.2 3/12/2018  9:34 PM Kassy Ayala MD Physician Sign     AU1.1 3/12/2018  8:48 PM  Kassy Ayala MD Physician                   Discharge Summaries     No notes of this type exist for this encounter.         Consult Notes      Consults by Se Barahona MD at 3/13/2018  8:04 AM     Author:  Se Barahona MD Service:  Surgery Author Type:  Physician    Filed:  3/13/2018  2:18 PM Date of Service:  3/13/2018  8:04 AM Creation Time:  3/13/2018  8:04 AM    Status:  Signed :  Se Barahona MD (Physician)     Consult Orders:    1. Surgery General IP Consult: Patient to be seen: Routine - within 24 hours; fall, syncope, scalp laceration, trauma; Consultant may enter orders: Yes [[id number]] ordered by Kassy Ayala MD at 03/12/18 2048                [facility]    History and Physical / Consult note: Trauma Service     Date of Admission:  3/12/2018    Time of Admission/Consult Request (page/call): [phone]    Time of my evaluation: 0715  3/13/3018  Consulting services: None[DL1.1]    Assessment & Plan[DL1.2]   Trauma mechanism: Same level fall with loss of conciousness  Time/date of injury: 3/12/2018 arrival to ER at 1734 pm  Known Injuries:  1. Cephalohematoma to posterior occiput and laceration  Other diagnoses:   1. Spl[DL1.1]e[DL1.3]nomegaly  2. Anemia of unknown origin  3. Left breast mass 3 cm     Plan:  1. Recommend continued workup for anemia.   2. Will need evaluation for new breast mass with mammogram and possible biopsy      General Cares:    DVT Prophylaxis:[DL1.1] SCDs[DL1.3]  Date of last stool/Bowel Regimen:[DL1.1] Docusate[DL1.3]  Pulmonary toilet:[DL1.1] Incentive spirometry[DL1.3]    ETOH: This patient was asked if in the last 3-6 months there has been a time when[DL1.1] she had 4 or more drinks in a single day/outing.[DL1.3]. Patient answer to the screening question was[DL1.1] in the negative. No intervention needed.[DL1.3]  Primary Care Physician   Garrett Amaro    Chief Complaint[DL1.2]   Same level fall with loss of conciousness    History  is obtained from the patient[DL1.3]    History of Present Illness[DL1.2]   Gina Beal is a 77 year old female who presents with[DL1.1] loss of conciousness and a same level fall while at home. She says that yesterday she was moving items around in her house and the next thing she knew she was on the floor. She says that she had not noticed and lightheadedness, dizziness and does not remember passing out. She had hit her head during the fall and had bleeding from her posterior scalp. She also had an abrasion to her left hand. She has not had any episodes of lightheadedness or dizziness in the past. She was seen in the emergency room where the posterior scalp laceration was stapled closed.[DL1.3]    Past Medical History[DL1.2]    Past medical history reviewed with no previously diagnosed medical problems.[DL1.3]    Past Surgical History[DL1.2]   Past surgical history reviewed with no previous surgeries identified.[DL1.3]  Prior to Admission Medications   Prior to Admission Medications   Prescriptions Last Dose Informant Patient Reported? Taking?   Furosemide (LASIX PO) 3/12/2018 at 0800  Yes Yes   Sig: Take 40 mg by mouth daily   HYDROCHLOROTHIAZIDE PO Unknown at Unknown time  Yes No   Sig: Take by mouth daily   HYDROcodone-acetaminophen (NORCO) 5-325 MG per tablet 3/12/2018 at 0800  Yes Yes   Sig: Take 1 tablet by mouth every 6 hours as needed   LORAZEPAM PO Unknown at Unknown time  Yes No   Sig: Take by mouth daily   Levothyroxine Sodium (SYNTHROID PO) 3/12/2018 at 0700  Yes Yes   Sig: Take 50 mcg by mouth daily    METFORMIN HCL PO 3/12/2018 at 0800  Yes Yes   Sig: Take 500 mg by mouth daily    Omeprazole (PRILOSEC PO) 3/12/2018 at 0700  Yes Yes   Sig: Take 20 mg by mouth 2 times daily (before meals)   Simvastatin (ZOCOR PO) 3/11/2018 at 1900  Yes Yes   Sig: Take 20 mg by mouth At Bedtime    Venlafaxine HCl (EFFEXOR PO) 3/12/2018 at 0800  Yes Yes   Sig: Take 75 mg by mouth daily   potassium chloride sveta er  (K-DUR) 3/11/2018 at 1900  Yes Yes   Sig: Take 10 mEq by mouth once      Facility-Administered Medications: None     Allergies   Allergies   Allergen Reactions     Aspirin      Flexeril [Cyclobenzaprine]      Iodine      Sulfa Drugs        Social History   Social History     Social History     Marital status:      Spouse name: N/A     Number of children: N/A     Years of education: N/A     Occupational History     Not on file.     Social History Main Topics     Smoking status: Not on file     Smokeless tobacco: Not on file     Alcohol use Not on file     Drug use: Not on file     Sexual activity: Not on file     Other Topics Concern     Not on file     Social History Narrative     No narrative on file       Family History[DL1.2]   Family history reviewed with patient and is noncontributory.[DL1.3]    Review of Systems[DL1.2]   CONSTITUTIONAL: No fever, chills, sweats, fatigue   EYES: no visual blurring, no double vision or visual loss  ENT: no decrease in hearing, no tinnitus, no vertigo, no hoarseness  RESPIRATORY: no shortness of breath, no cough, no sputum   CARDIOVASCULAR: no palpitations, no chest  pain, no exertional chest pain or pressure  GASTROINTESTINAL: no nausea or vomiting, or abd pain  GENITOURINARY: no dysuria, no frequency or hesitancy, no hematuria  MUSCULOSKELETAL: no weakness, no redness, no swelling, no joint pain,   SKIN: no rashes, ecchymoses  NEUROLOGIC: no numbness or tingling of hands, no numbness or tingling  of feet, no syncope, no tremors or weakness  PSYCHIATRIC: no sleep disturbances, no anxiety or depression[DL1.1]    Physical Exam   Temp: 98.4  F (36.9  C) Temp src: Tympanic BP: 134/68 Pulse: 71 Heart Rate: 65 Resp: 16 SpO2: 93 % O2 Device: None (Room air)[DL1.2]    Vital Signs with Ranges[DL1.1]  Temp:  [97.6  F (36.4  C)-98.6  F (37  C)] 98.4  F (36.9  C)  Pulse:  [71-76] 71  Heart Rate:  [63-86] 65  Resp:  [16-18] 16  BP: (112-165)/(49-68) 134/68  SpO2:  [92 %-97 %] 93 % 139  lbs 15.87 oz[DL1.2]    Primary Survey:  Airway: patient talking  Breathing: symmetric respiratory effort bilaterally  Circulation: central pulses present and peripheral pulses present  Disability: Pupils - left 4 mm and brisk, right 4 mm and brisk     Vesper Coma Scale - Total[DL1.1] 15[DL1.3]/15  Eye Response (E):[DL1.1] 4[DL1.3]  4= spontaneous,  3= to verbal/voice, 2=  to pain, 1= No response   Verbal Response (V):[DL1.1] 5[DL1.3]   5= Orientated, converses,  4= Confused, converses, 3= Inappropriate words,  2= Incomprehensible sounds,  1=No response   Motor Response (M):[DL1.1] 6[DL1.3]   6= Obeys commands, 5= Localizes to pain, 4= Withdrawal to pain, 3=Fexion to pain, 2= Extension to pain, 1= No response    Secondary Survey:  General: alert, oriented to person, place, time  Head:[DL1.1] 1.5 cm laceration with staples intact on the posterior scalp with associated cephlohematoma[DL1.3], trachea midline  Eyes: PERRLA, pupils[DL1.1] 4[DL1.3]mm, EOMI, corneas and conjunctivae clear  Ears:[DL1.1] TMs obstructed with cerumen[DL1.3] and non-inflamed external ear canals  Nose: nares patent, no drainage, nasal septum non-tender  Mouth/Throat: no exudates or erythema,  no dental tenderness or malocclusions, no tongue lacerations  Neck:[DL1.1]  No[DL1.3] cervical collar present. No midline posterior tenderness, full AROM without pain or tenderness   Chest/Pulmonary: normal respiratory rate and rhythm,  bilateral clear breath sounds, no wheezes, rales or rhonchi,[DL1.1] no palpable abnormalities[DL1.4]  Cardiovascular: S1, S2,  normal and regular rate and rhythm, no murmurs  Abdomen: soft, non-tender, no guarding, no rebound tenderness and no tenderness to palpation  : normal external genitalia, pelvis stable to lateral compression, no salcedo, no urine assess  Back/Spine: no deformity, no midline tenderness, no sacral tenderness,  no step-offs and no abrasions or contusions  Musculoskel/Extremities: normal extremities,  full AROM of major joints without tenderness, edema, erythema, ecchymosis, or abrasions.   Hand: no gross deformities of hands or fingers. Full AROM of hand and fingers in flexion and extension.  strength equal and symmetric.   Skin: no rashes, laceration, ecchymosis, skin warm and dry.   Neuro: PERRLA, alert, oriented x[DL1.1] 3[DL1.3]. CN II-XII grossly intact. No focal deficits. Strength[DL1.1] 5[DL1.3]/5 x 4 extremities.  Sensation intact.  Psychiatric: affect/mood normal, cooperative, normal judgement/insight and memory intact[DL1.1]    Data[DL1.2]   UA RESULTS:  Recent Labs   Lab Test  03/12/18   2115   COLOR  Yellow   APPEARANCE  Slightly Cloudy   URINEGLC  Negative   URINEBILI  Negative   URINEKETONE  Negative   SG  1.013   UBLD  Negative   URINEPH  7.0   PROTEIN  Negative   NITRITE  Positive*   LEUKEST  Large*   RBCU  1   WBCU  >182*[DL1.1]      Results for orders placed or performed during the hospital encounter of 03/12/18 (from the past 24 hour(s))   CBC with platelets differential   Result Value Ref [facility]    WBC 2.6 (L) 4.0 - 11.0 10e9/L    RBC Count 3.95 3.8 - 5.2 10e12/L    Hemoglobin 7.0 (L) 11.7 - 15.7 g/dL    Hematocrit 25.0 (L) 35.0 - 47.0 %    MCV 63 (L) 78 - 100 fl    MCH 17.7 (L) 26.5 - 33.0 pg    MCHC 28.0 (L) 31.5 - 36.5 g/dL    RDW 20.1 (H) 10.0 - 15.0 %    Platelet Count 110 (L) 150 - 450 10e9/L    Diff Method Automated Method     % Neutrophils 57.1 %    % Lymphocytes 31.8 %    % Monocytes 8.0 %    % Eosinophils 2.3 %    % Basophils 0.4 %    % Immature Granulocytes 0.4 %    Nucleated RBCs 0 0 /100    Absolute Neutrophil 1.5 (L) 1.6 - 8.3 10e9/L    Absolute Lymphocytes 0.8 0.8 - 5.3 10e9/L    Absolute Monocytes 0.2 0.0 - 1.3 10e9/L    Absolute Eosinophils 0.1 0.0 - 0.7 10e9/L    Absolute Basophils 0.0 0.0 - 0.2 10e9/L    Abs Immature Granulocytes 0.0 0 - 0.4 10e9/L    Absolute Nucleated RBC 0.0     Elliptocytes P    Comprehensive metabolic panel   Result Value Ref Range    Sodium 141 133  - 144 mmol/L    Potassium 3.2 (L) 3.4 - 5.3 mmol/L    Chloride 107 94 - 109 mmol/L    Carbon Dioxide 25 20 - 32 mmol/L    Anion Gap 9 3 - 14 mmol/L    Glucose 119 (H) 70 - 99 mg/dL    Urea Nitrogen 13 7 - 30 mg/dL    Creatinine 0.88 0.52 - 1.04 mg/dL    GFR Estimate 63 >60 mL/min/1.7m2    GFR Estimate If Black 76 >60 mL/min/1.7m2    Calcium 8.2 (L) 8.5 - 10.1 mg/dL    Bilirubin Total 0.7 0.2 - 1.3 mg/dL    Albumin 3.6 3.4 - 5.0 g/dL    Protein Total 7.4 6.8 - 8.8 g/dL    Alkaline Phosphatase 150 40 - 150 U/L    ALT 26 0 - 50 U/L    AST 35 0 - 45 U/L   INR   Result Value Ref Range    INR 1.12 0.80 - 1.20   Troponin I   Result Value Ref Range    Troponin I ES <0.015 0.000 - 0.045 ug/L   CT Head w/o Contrast    Narrative    Exam: CT HEAD W/O CONTRAST    Clinical history:77 years Female fall;     Comparisons:None    Technique: Axial CT imaging of the head was performed Without  intervenous contrast.    FINDINGS:   Ventricles and sulci are symmetric. The gray-white matter  differentiation throughout the brain is well maintained. There is  moderate periventricular white matter change of chronic small vessel  ischemic disease. There is no evidence of intracranial mass or  hemorrhage. Visualized portions of the paranasal sinuses and mastoid  air cells are well aerated. There is no evidence of skull fracture.    There is a posterior left para midline scalp laceration.      Impression    IMPRESSION: Posterior left para midline scalp wound. There is no  evidence of intracranial hemorrhage or skull fracture.    MATTIE CHARLES MD   Immunos occult blood   Result Value Ref Range    Occult Blood Slide 1 Negative NEG^Negative   Ferritin   Result Value Ref Range    Ferritin 5 (L) 8 - 252 ng/mL   Iron and iron binding capacity   Result Value Ref Range    Iron 14 (L) 35 - 180 ug/dL    Iron Binding Cap 592 (H) 240 - 430 ug/dL    Iron Saturation Index 2 (L) 15 - 46 %   Lactate Dehydrogenase   Result Value Ref Range    Lactate  Dehydrogenase 191 81 - 234 U/L   TSH with free T4 reflex   Result Value Ref Range    TSH 1.91 0.40 - 4.00 mU/L   CBC with platelets differential   Result Value Ref Range    WBC 2.2 (L) 4.0 - 11.0 10e9/L    RBC Count 3.71 (L) 3.8 - 5.2 10e12/L    Hemoglobin 6.5 (LL) 11.7 - 15.7 g/dL    Hematocrit 23.5 (L) 35.0 - 47.0 %    MCV 63 (L) 78 - 100 fl    MCH 17.5 (L) 26.5 - 33.0 pg    MCHC 27.7 (L) 31.5 - 36.5 g/dL    RDW 20.0 (H) 10.0 - 15.0 %    Platelet Count 96 (L) 150 - 450 10e9/L    Diff Method Automated Method     % Neutrophils 59.4 %    % Lymphocytes 31.3 %    % Monocytes 6.9 %    % Eosinophils 1.4 %    % Basophils 0.5 %    % Immature Granulocytes 0.5 %    Nucleated RBCs 0 0 /100    Absolute Neutrophil 1.3 (L) 1.6 - 8.3 10e9/L    Absolute Lymphocytes 0.7 (L) 0.8 - 5.3 10e9/L    Absolute Monocytes 0.2 0.0 - 1.3 10e9/L    Absolute Eosinophils 0.0 0.0 - 0.7 10e9/L    Absolute Basophils 0.0 0.0 - 0.2 10e9/L    Abs Immature Granulocytes 0.0 0 - 0.4 10e9/L    Absolute Nucleated RBC 0.0    Reticulocyte Count   Result Value Ref Range    % Retic 1.5 0.5 - 2.0 %    Absolute Retic 57.1 25 - 95 10e9/L   ABO/Rh type and screen   Result Value Ref [facility]    ABO O     RH(D) Pos     Antibody Screen Neg     Test Valid Only At [facility]        Specimen Expires 03/15/2018    Blood component   Result Value Ref Range    Unit Number [id number]     Blood Component Type Red Blood Cells Leukocyte Reduced     Division Number 00     Status of Unit Released to care unit     Blood Product Code X0688A49     Unit Status ISS    Blood component   Result Value Ref Range    Unit Number [id number]     Blood Component Type Red Blood Cells Leukocyte Reduced     Division Number 00     Status of Unit Released to care unit 03/13/2018 0157     Blood Product Code N3100I87     Unit Status ISS    Blood component   Result Value Ref Range    Unit Number [id number]     Blood Component Type Red Blood Cells LeukoReduced (Part 2)     Division  Number 00     Status of Unit Released to care unit 03/13/2018 0424     Blood Product Code [id number]     Unit Status ISS    UA with Microscopic   Result Value Ref Range    Color Urine Yellow     Appearance Urine Slightly Cloudy     Glucose Urine Negative NEG^Negative mg/dL    Bilirubin Urine Negative NEG^Negative    Ketones Urine Negative NEG^Negative mg/dL    Specific Gravity Urine 1.013 1.003 - 1.035    Blood Urine Negative NEG^Negative    pH Urine 7.0 4.7 - 8.0 pH    Protein Albumin Urine Negative NEG^Negative mg/dL    Urobilinogen mg/dL 2.0 0.0 - 2.0 mg/dL    Nitrite Urine Positive (A) NEG^Negative    Leukocyte Esterase Urine Large (A) NEG^Negative    Source Midstream Urine     WBC Urine >182 (H) 0 - 5 /HPF    RBC Urine 1 0 - 2 /HPF    Bacteria Urine Few (A) NEG^Negative /HPF   Active MRSA Surveillance Culture   Result Value Ref [facility]    Specimen Description Nares     Culture Micro Culture in progress    CT Abdomen Pelvis w Contrast    Narrative    CT ABDOMEN PELVIS W CONTRAST    CLINICAL HISTORY: Female, age 77 years,  abdominal pain and anemia; ;    Comparison:  None.    TECHNIQUE:  CT was performed of the abdomen and pelvis with IV and  oral contrast. Sagittal, coronal and axial reconstructions were  reviewed.     FINDINGS:    Abdomen/Pelvis CT:  Lung Bases:  Mild centrilobular emphysema.    There is asymmetry in density breast tissue within the left breast  suggesting a 3 cm mass.    Esophagus/stomach: Large hiatal hernia.    Liver:  Normal.  Portable venous system: Patent.  Gallbladder: Surgically absent    Spleen: Splenomegaly.    Pancreas: Normal.    Adrenal Glands: Normal.    Kidneys: 2.5 cm parapelvic cyst midpole left kidney.  Ureters: Grossly normal.  Urinary bladder: Normal.    Abdominal Aorta: Scattered atherosclerotic calcifications.  IVC: Normal.    Lymph Nodes: Normal-sized nodes within the mesentery and  retroperitoneum.    Large and Small Bowel: Moderate volume of stool. No acute  abnormality.  Appendix: Not seen. No secondary signs of appendicitis.    Pelvic Organs:  Atrophic uterus.  Peritoneum: Normal.  Bony structures: Scattered degenerative changes. No evidence of acute  or concerning of normality. Degenerative spinal listhesis of L5  relative to S1 with bilateral foraminal stenosis.    Other Findings:  Inguinal lymph nodes are prominent in size, at the  upper limits of normal.      Impression    IMPRESSION:  1.   Asymmetry of tissue within the left breast, concerning for a 3 cm  left breast mass.    2.  Splenomegaly with a number of normal-sized nodes throughout the  retroperitoneum and prominent nodes within the inguinal regions.    3.  Surgical absence of the gallbladder with slight prominence of the  intrahepatic portions of biliary tree.    This report is in agreement with the preliminary report.    ISAAC MARKHAM MD   Glucose by meter   Result Value Ref Range    Glucose 88 70 - 99 mg/dL   CBC with platelets differential   Result Value Ref Range    WBC 2.5 (L) 4.0 - 11.0 10e9/L    RBC Count 4.46 3.8 - 5.2 10e12/L    Hemoglobin 9.2 (L) 11.7 - 15.7 g/dL    Hematocrit 30.5 (L) 35.0 - 47.0 %    MCV 68 (L) 78 - 100 fl    MCH 20.6 (L) 26.5 - 33.0 pg    MCHC 30.2 (L) 31.5 - 36.5 g/dL    RDW 23.9 (H) 10.0 - 15.0 %    Platelet Count 94 (L) 150 - 450 10e9/L    Diff Method Automated Method     % Neutrophils 58.9 %    % Lymphocytes 29.8 %    % Monocytes 8.5 %    % Eosinophils 1.6 %    % Basophils 0.8 %    % Immature Granulocytes 0.4 %    Nucleated RBCs 0 0 /100    Absolute Neutrophil 1.5 (L) 1.6 - 8.3 10e9/L    Absolute Lymphocytes 0.7 (L) 0.8 - 5.3 10e9/L    Absolute Monocytes 0.2 0.0 - 1.3 10e9/L    Absolute Eosinophils 0.0 0.0 - 0.7 10e9/L    Absolute Basophils 0.0 0.0 - 0.2 10e9/L    Abs Immature Granulocytes 0.0 0 - 0.4 10e9/L    Absolute Nucleated RBC 0.0    Basic metabolic panel   Result Value Ref Range    Sodium 140 133 - 144 mmol/L    Potassium 3.9 3.4 - 5.3 mmol/L    Chloride 108 94  - 109 mmol/L    Carbon Dioxide 25 20 - 32 mmol/L    Anion Gap 7 3 - 14 mmol/L    Glucose 89 70 - 99 mg/dL    Urea Nitrogen 11 7 - 30 mg/dL    Creatinine 0.85 0.52 - 1.04 mg/dL    GFR Estimate 65 >60 mL/min/1.7m2    GFR Estimate If Black 79 >60 mL/min/1.7m2    Calcium 8.0 (L) 8.5 - 10.1 mg/dL[DL1.5]       Studies:[DL1.1]  CT Head w/o Contrast    (Results Pending)   CT Abdomen Pelvis w Contrast    (Results Pending)       Se Barahona[DL1.2]   3/13/2018[DL1.6]         Revision History        User Key Date/Time User Provider Type Action    > DL1.4 3/13/2018  2:18 PM Se Barahona MD Physician Sign     DL1.6 3/13/2018 11:32 AM Se Barahona MD Physician      DL1.5 3/13/2018 11:31 AM Se Barahona MD Physician      DL1.3 3/13/2018 11:23 AM Se Barahona MD Physician      DL1.2 3/13/2018  8:05 AM Se Barahona MD Physician      DL1.1 3/13/2018  8:04 AM Se Barahona MD Physician                      Progress Notes - Physician (Notes from 03/11/18 through 03/14/18)      Progress Notes by Ada Graham CM at 3/14/2018  7:36 AM     Author:  Ada Graham CM Service:  (none) Author Type:      Filed:  3/14/2018  7:37 AM Date of Service:  3/14/2018  7:36 AM Creation Time:  3/14/2018  7:36 AM    Status:  Signed :  Ada Graham CM ()         Name: Gina Beal    MRN#: [id number]    Reason for Hospitalization: Anemia [D64.9]  Fall, initial encounter [W19.XXXA]  Laceration of scalp, initial encounter [S01.01XA]    Discharge Date:[NH1.1] 3/14/2018[NH1.2]    Patient / Family response to discharge plan: Agrees    Follow-Up Appt: No future appointments.    Other Providers (Care Coordinator, County Services, PCA services etc): No    Discharge Disposition: home  Family to transport    Ada Graham[NH1.1]       Revision History        User Key Date/Time User Provider Type Action    > NH1.2 3/14/2018  7:37 AM Ada Graham CM  Sign     NH1.1  3/14/2018  7:36 AM Ada Graham CM              Progress Notes by Aman Phipps MD at 3/13/2018 11:40 AM     Author:  Aman Phipps MD Service:  Internal Medicine Author Type:  Physician    Filed:  3/13/2018 11:54 AM Date of Service:  3/13/2018 11:40 AM Creation Time:  3/13/2018 11:40 AM    Status:  Signed :  Aman Phipps MD (Physician)         [facility]    Hospitalist Progress Note      Assessment & Plan   Gina Beal is a 77 year old female who was admitted on 3/12/2018.      1.  Syncope with head laceration:   Patient was standing on a stool ranging her knickknacks and the next thing she knew she was on the floor.  Did suffer a small head laceration on her top of her head.  She had no chest pains or dizziness palpitations shortness of breath at all with this episode.  She is not sure if she lost her balance or tripped or exactly what happened.  She did have her head laceration stapled in the ER with 4 staples.  She has had no dysrhythmias overnight on telemetry.  Hemodynamically she has been stable.  Question of whether or not her anemia caused her episode is the question what and likely true.  We will get her up and ambulate her now that she has been transfused and see how she does.  Staples will be removed in 7-10 days.    2.  Anemia in setting of pancytopenia:   Her hemoglobin was 6.5.  She has been transfused 3 units of packed red blood cells her hemoglobin is now up to 9.2.  She is remained hemodynamically stable.  She denies any evidence of bleeding issues.  Has never thrown up any blood.  Has not noticed any black tarry stools or red stools at all.  Unfortunately her last hemoglobin check was back in 2013 at which time was 11.  Her stool was guaiac negative.  She definitely has iron deficiency.  With microcytosis.  A peripheral smear was done is pending at this time.  As above this does not appear to be an acute blood loss anemia.  In discussing with her  she did have a colonoscopy however the last time that was performed I believe was almost 6+ years ago.  She did have a adenomatous polyp at that time.  Has never had any heartburn complaints dysphasia.  She does have a significant hiatal hernia noted on her CT scan.  Given her microcytosis and iron deficiency I think she will need to have a colonoscopy and endoscopy performed to evaluate her.  This is not an acute need.  Have briefly discussed this with Dr. Barahona surgeon was saw her for her trauma.  We can set this up as an outpatient with a clinic visit.  Given her leukopenia and thrombocytopenia further evaluation may need to be entertained with possible bone marrow biopsy also.    3.  Left breast mass:   On CT scan there is noted a 3 cm mass.  The patient does have yearly mammograms.  No palpable masses noted currently.  We will need this further evaluated with repeat mammography in her primary care provider clinic with possible further evaluation biopsy etc. pending those results.    4.  Pyuria/UTI: She was given some IV Rocephin for this.  Her urine cultures pending at this time she has has no fever.    Diet: Combination Diet Low Saturated Fat Na <2400mg Diet, No Caffeine Diet  Fluids: IV lock    # Pain Assessment:   Current Pain Score 3/13/2018 3/13/2018 3/13/2018   Patient currently in pain? yes yes yes   Pain score (0-10) 2 2 4   Pain location Head Head Head   Pain descriptors Dull Dull Sore   - Gina is experiencing pain due to chronic neck pain. Pain management was discussed and the plan was created in a collaborative fashion.  Gina's response to the current recommendations: engaged  compliant  - Opioid regimen: Norco  - Response to opioid medications: Reduction of symptoms   - Bowel regimen: not needed          DVT Prophylaxis: Pneumatic Compression Devices  Code Status: Full Code    Disposition: Expected discharge in 1-2 days once stable hgb.    Aman Phipps    Interval History   Patient is  alert oriented.  Feels actually quite good.  Has a little sore from head laceration but no new aches or pains.  Denies any shortness of breath chest pain abdominal pain heartburn at all.  No peripheral edema no fevers chills sweats cough purulent sputum.  Once again she is not sure if she actually lost consciousness or she lost her balance when she fell from the stool.  Once again she is very adamant she has not noted any black tarry stools blood in her stool but her urine.  No abdominal pain no significant heartburn symptoms.  She has been under a fair amount of stress given her 's recent diagnosis of cancer.    -Data reviewed today: I reviewed all new labs and imaging results over the last 24 hours. I personally reviewed no images or EKG's today.    Physical Exam   Temp: 98.3  F (36.8  C) Temp src: Tympanic BP: 135/64 Pulse: 71 Heart Rate: 69 Resp: 16 SpO2: 92 % O2 Device: None (Room air)    Vitals:    03/12/18 2024   Weight: 63.5 kg (139 lb 15.9 oz)     Vital Signs with Ranges  Temp:  [97.6  F (36.4  C)-98.6  F (37  C)] 98.3  F (36.8  C)  Pulse:  [71-76] 71  Heart Rate:  [63-86] 69  Resp:  [16-18] 16  BP: (112-165)/(49-68) 135/64  SpO2:  [92 %-97 %] 92 %  I/O last 3 completed shifts:  In: 942.5   Out: 300 [Urine:300]    Peripheral IV 03/12/18 Right Upper forearm (Active)   Site Assessment WDL 3/13/2018 10:13 AM   Line Status Saline locked 3/13/2018 10:13 AM   Phlebitis Scale 0-->no symptoms 3/13/2018 10:13 AM   Infiltration Scale 0 3/13/2018 10:13 AM   Number of days:1     No line/device    Constitutional: Alert and oriented x3. No distress    HEENT: Head laceration on the top of her head with 4 staples looks fine., PERRL, EOMI, mouth moist, neck supple, no LN.     Cardiovascular: RRR. no Murmur, no  rubs, or gallops.  JVD flat.  Bruits no.  Pulses 2+    Respiratory: Clear to auscultation bilaterally    Abdomen: Soft, NTND, no organomegaly. Bowel sounds present    Extremities: Warm/dry. No edema    Neuro:    Non focal, cranial nerves intact, Moves all extremities.  Medications       cefTRIAXone  1 g Intravenous Q24H     venlafaxine  75 mg Oral Daily with breakfast     diatrizoate meglumine-sodium  30 mL Oral Once     levothyroxine (SYNTHROID/LEVOTHROID) tablet 50 mcg  50 mcg Oral Daily     omeprazole (priLOSEC) CR capsule 20 mg  20 mg Oral BID AC     simvastatin (ZOCOR) tablet 20 mg  20 mg Oral At Bedtime     docusate sodium  100 mg Oral BID     potassium chloride  40 mEq Oral BID       Data     Recent Labs  Lab 03/13/18  0743 03/12/18  2056 03/12/18  1744   WBC 2.5* 2.2* 2.6*   HGB 9.2* 6.5* 7.0*   MCV 68* 63* 63*   PLT 94* 96* 110*   INR  --   --  1.12     --  141   POTASSIUM 3.9  --  3.2*   CHLORIDE 108  --  107   CO2 25  --  25   BUN 11  --  13   CR 0.85  --  0.88   ANIONGAP 7  --  9   CIPRIANO 8.0*  --  8.2*   GLC 89  --  119*   ALBUMIN  --   --  3.6   PROTTOTAL  --   --  7.4   BILITOTAL  --   --  0.7   ALKPHOS  --   --  150   ALT  --   --  26   AST  --   --  35   TROPI  --   --  <0.015       Recent Results (from the past 24 hour(s))   CT Head w/o Contrast    Narrative    Exam: CT HEAD W/O CONTRAST    Clinical history:77 years Female fall;     Comparisons:None    Technique: Axial CT imaging of the head was performed Without  intervenous contrast.    FINDINGS:   Ventricles and sulci are symmetric. The gray-white matter  differentiation throughout the brain is well maintained. There is  moderate periventricular white matter change of chronic small vessel  ischemic disease. There is no evidence of intracranial mass or  hemorrhage. Visualized portions of the paranasal sinuses and mastoid  air cells are well aerated. There is no evidence of skull fracture.    There is a posterior left para midline scalp laceration.      Impression    IMPRESSION: Posterior left para midline scalp wound. There is no  evidence of intracranial hemorrhage or skull fracture.    MATTIE CHARLES MD   CT Abdomen Pelvis w Contrast     Narrative    CT ABDOMEN PELVIS W CONTRAST    CLINICAL HISTORY: Female, age 77 years,  abdominal pain and anemia; ;    Comparison:  None.    TECHNIQUE:  CT was performed of the abdomen and pelvis with IV and  oral contrast. Sagittal, coronal and axial reconstructions were  reviewed.     FINDINGS:    Abdomen/Pelvis CT:  Lung Bases:  Mild centrilobular emphysema.    There is asymmetry in density breast tissue within the left breast  suggesting a 3 cm mass.    Esophagus/stomach: Large hiatal hernia.    Liver:  Normal.  Portable venous system: Patent.  Gallbladder: Surgically absent    Spleen: Splenomegaly.    Pancreas: Normal.    Adrenal Glands: Normal.    Kidneys: 2.5 cm parapelvic cyst midpole left kidney.  Ureters: Grossly normal.  Urinary bladder: Normal.    Abdominal Aorta: Scattered atherosclerotic calcifications.  IVC: Normal.    Lymph Nodes: Normal-sized nodes within the mesentery and  retroperitoneum.    Large and Small Bowel: Moderate volume of stool. No acute abnormality.  Appendix: Not seen. No secondary signs of appendicitis.    Pelvic Organs:  Atrophic uterus.  Peritoneum: Normal.  Bony structures: Scattered degenerative changes. No evidence of acute  or concerning of normality. Degenerative spinal listhesis of L5  relative to S1 with bilateral foraminal stenosis.    Other Findings:  Inguinal lymph nodes are prominent in size, at the  upper limits of normal.      Impression    IMPRESSION:  1.   Asymmetry of tissue within the left breast, concerning for a 3 cm  left breast mass.    2.  Splenomegaly with a number of normal-sized nodes throughout the  retroperitoneum and prominent nodes within the inguinal regions.    3.  Surgical absence of the gallbladder with slight prominence of the  intrahepatic portions of biliary tree.    This report is in agreement with the preliminary report.    ISAAC MARKHAM MD[SS1.1]        Revision History        User Key Date/Time User Provider Type Action    > SS1.1 3/13/2018  11:54 AM Aman Phipps MD Physician Sign            Progress Notes by Ada Graham CM at 3/13/2018 10:54 AM     Author:  Ada Graham CM Service:  (none) Author Type:      Filed:  3/13/2018 11:28 AM Date of Service:  3/13/2018 10:54 AM Creation Time:  3/13/2018 10:54 AM    Status:  Signed :  Ada Graham CM ()         Assessment completed see flowsheet.   ?   LOC: alert   Others present: Patient and Family   Dx: Anemia   ?   Lives with: Lives With: spouse   Living at: Living Arrangements: house   Equipment used: None    Support System: Description of Support System: Supportive, Involved   ?   ?   Primary PCP: Dr. Amaro   POA/Guardian: No    Health Care Directive: YES Will bring to health information to put on file.    Pharmacy: [facility]   : No   Homecare/County Services: No   ?   Adequate Resources for needs (housing, utilities, food/med): YES   Meds and appointments management: YES Self manages appointmentsand meds  Work: NO/Retired  Transportation: YES   ?   ADLs: Independent   Falls: Yes  Reason for falls risk: Other- fall d/t syncope which was d/t low hemoglobin   Able to Return to Prior Living Arrangements: YES   : NO   ?   ?   ?   ?   Goals: Return home   Barriers: none   Needs: None   DEISY: average   Discharge Plan: Return home   Met with Gina in her room for assessment. Her daughter Rosalind and granddaughter were present. Gina was laying in bed . Alert and oriented. Very pleasant. She lives in a home with her  Simon. Her 3 adult daughters live nearby . 2 in [place] and 1 in [place].  PCP Dr. Amaro.  Dentist Dr. Channing Medina last seen 3-4 years ago.  Eye care Dr. Marilia Gale last seen 1 year ago.  No specialists.  Pharmacy [facility].  Manages her own appointments and meds. Uses no medical equipment. Does have a healthcare directive and will bring it in,  to have it on file here.   Independent with cares. Independent with  ambulation. Does have a walker at home from an old knee surgery . Feels safe at home and home is well maintained. Steps and railings are in good shape. Has smoke and CO2 alarms. Does not have grab bars in the shower and suggested she consider these. Did fall which was d/t syncope caused by low hemoglobin. No other current falls. Shopping and food prep done by Gina.  She doesn't drive, so Simon does the driving.   Sleeps good at night. States she has depression but is stable at this time . She takes an antidepressant for this. Non-smoker quit 15 yrs ago. Rarely uses alcohol. No illicit drug use. Rastafarian rose is important and attends [employer] in [place]. Recent stressor is Simon's dx of cancer but has a lot of support by family.  She feels very supported by her  , 3 daughters, grandaughter and her sister /brother. Spare time she cleans and bakes.   Gina indicates she has no needs to return home. No needs assessed by CTS. CTS will remain available.[NH1.1]             Revision History        User Key Date/Time User Provider Type Action    > NH1.1 3/13/2018 11:28 AM Ada Graham CM  Sign            ED Notes by Ev Ayala RN at 3/12/2018  8:11 PM     Author:  Ev Ayala RN Service:  (none) Author Type:  Registered Nurse    Filed:  3/12/2018  8:11 PM Date of Service:  3/12/2018  8:11 PM Creation Time:  3/12/2018  8:11 PM    Status:  Signed :  Ev Ayala RN (Registered Nurse)         Report to [facility] and pt transferred to 3rd floor by . Pt to start drinking CT contrast at 2030, and Francine aware.[KS1.1]      Revision History        User Key Date/Time User Provider Type Action    > KS1.1 3/12/2018  8:11 PM Ev Ayala RN Registered Nurse Sign            ED Notes by Lennie Beverly RN at 3/12/2018  7:15 PM     Author:  Lennie Beverly RN Service:  (none) Author Type:  Registered Nurse    Filed:  3/12/2018  7:18 PM Date of Service:  3/12/2018  7:15 PM Creation Time:   3/12/2018  7:15 PM    Status:  Addendum :  Lennie Beverly, RN (Registered Nurse)         Dr. Robledo[CP1.1]  MD at bedside[CP1.2], unable to reassess pain or neuro.[CP1.1]       Revision History        User Key Date/Time User Provider Type Action    > CP1.1 3/12/2018  7:18 PM Lennie Beverly, RN Registered Nurse Addend     CP1.2 3/12/2018  7:15 PM Lennie Beverly RN Registered Nurse Sign            ED Provider Notes by Tomasa Coppola MD at 3/12/2018  5:01 PM     Author:  Tomasa Coppola MD Service:  Emergency Medicine Author Type:  Physician    Filed:  3/12/2018  7:17 PM Date of Service:  3/12/2018  5:01 PM Creation Time:  3/12/2018  5:32 PM    Status:  Signed :  Tomasa Coppola MD (Physician)         eMERGENCY dEPARTMENT eNCOUnter        CHIEF COMPLAINT[JH1.1]    Chief Complaint   Patient presents with     Head Laceration     Fell from the 2nd step on the step stool with LOC.[JH1.2]       HPI    Gina Beal is a 77 year old female who presents with fall.  History is obtained from the patient and from her family.  She said she was cleaning her knickknacks at her house.  She lives there with her  he was upstairs but is very hard of hearing.  She remembers adjusting then the next thing she knew she woke up on the floor there was blood on her hand and on the floor she put her she does not know how long she was unconscious.  She walked upstairs from her  he called their daughter who lives a few houses away who picked them up and they all came into the emergency department.  Hand on the back of her head and realized it was bleeding.  We also went through this several times.  Irina is not sure if she tripped and fell from the stool or exactly what happened.  She says she has been feeling well otherwise although was feeling dizzy today.  Specifically she denies any fevers chills cough chest pain.  Positive for   REVIEW OF SYSTEMS    Neurologic: positive  LOC, No hearing loss  Cardiac: No  Chest Pain, positive for syncope  Respiratory: No cough or difficulty breathing  GI: No Bloody Stool or Diarrhea  : No Dysuria or Hematuria  General: No Fever  All other systems reviewed and are negative.    PAST MEDICAL & SURGICAL HISTORY[JH1.1]    History reviewed. No pertinent past medical history.  History reviewed. No pertinent surgical history.[JH1.2]    CURRENT MEDICATIONS[JH1.1]    Current Outpatient Rx   Medication Sig Dispense Refill     Furosemide (LASIX PO) Take 40 mg by mouth daily       UNABLE TO FIND 10 mEq daily Takes Potassium Chloride 10 meq by mouth at night       Omeprazole (PRILOSEC PO) Take 20 mg by mouth 2 times daily (before meals)       Venlafaxine HCl (EFFEXOR PO) Take 75 mg by mouth daily       Levothyroxine Sodium (SYNTHROID PO) Take 50 mcg by mouth daily        Sertraline HCl (ZOLOFT PO) Take by mouth daily       METFORMIN HCL PO Take 500 mg by mouth daily        HYDROcodone-acetaminophen (NORCO) 5-325 MG per tablet Take 1 tablet by mouth every 6 hours as needed       Simvastatin (ZOCOR PO) Take 20 mg by mouth At Bedtime        HYDROCHLOROTHIAZIDE PO Take by mouth daily       LORAZEPAM PO Take by mouth daily[JH1.2]         ALLERGIES[JH1.1]    Allergies   Allergen Reactions     Aspirin      Flexeril [Cyclobenzaprine]      Iodine      Sulfa Drugs[JH1.2]        SOCIAL & FAMILY HISTORY    Social History     Social History     Marital status:      Spouse name: N/A     Number of children: N/A     Years of education: N/A     Social History Main Topics     Smoking status: Not on file     Smokeless tobacco: Not on file     Alcohol use Not on file     Drug use: Not on file     Sexual activity: Not on file     Other Topics Concern     Not on file     Social History Narrative[JH1.1]     No family history on file.[JH1.2]    PHYSICAL EXAM    VITAL SIGNS:[JH1.1] BP (!) 128/49  Temp 97.7  F (36.5  C) (Tympanic)  Resp 16  Ht 1.524 m (5')  SpO2 97%[JH1.2]   Constitutional:  Well developed,  well nourished, no acute distress, she is alert pleasant and very talkative.  Eyes: Pupils equally round and react to light, sclera nonicteric  HENT: She has a 2 cm laceration to her left occiput.  No other bruising or injury is noted., no trismus  Neck: supple, no JVD, noposterior neck tenderness  Respiratory:  Lungs Clear, no retractions   Cardiovascular: Regular rate, no murmurs  GI:  Soft, nontender, normal bowel sounds  Musculoskeletal:  No edema, no  Vascular: All pulses are 2+ equal bilaterally  Integument:  Well hydrated, no petechiae   Neurologic:  Alert & oriented, no slurred speech  Psych: Pleasant affect, no hallucinations    EKG    NSR, no ischemic changes    RADIOLOGY  (read by the radiologist)  CT head negative for bleed        ED COURSE & MEDICAL DECISION MAKING    Pertinent Labs & Imaging studies reviewed and interpreted. (See chart for details)    [facility] Procedure Note        Laceration Repair:    Performed by: Tomasa Coppola  Authorized by: Tomasa Coppola  Consent given by: Patient and Family who states understanding of the procedure being performed after discussing the risks, benefits and alternatives.    Preparation: Patient was prepped and draped in usual sterile fashion.  Irrigation solution: saline    Body area:scalp  Laceration length: 2cm  Contamination: The wound is not contaminated.  Foreign bodies:none  Tendon involvement: none  Anesthesia: Local  Local anesthetic: Bupivacaine 0.5%, Lidocaine     1%  Anesthetic total: 3ml    Debridement: none  Skin closure: Closed with 4 Staples  Technique: interrupted  Approximation: close  Approximation difficulty: simple    Patient tolerance: Patient tolerated the procedure well with no immediate complications.    See chart for details of medications given during the ED stay.[JH1.1]    Vitals:    03/12/18 1704   BP: (!) 128/49   Resp: 16   Temp: 97.7  F (36.5  C)   TempSrc: Tympanic   SpO2: 97%   Height: 1.524 m (5')[JH1.2]           FINAL  IMPRESSION[JH1.1]    1. Anemia    2. Fall, initial encounter    3. Laceration of scalp, initial encounter[JH1.2]        PLAN   I suspect her fall is due to syncope secondary to anemia.  She will be admitted for transfusion, telemetry, to Dr. Ayala and he is here in the department to see the patient.  Dr. Barahona of trauma surgery was notified at 1905 by phone for consult.         (Please note that this note was completed with a voice recognition program.  Every attempt was made to edit the dictations, but inevitably there remain words that are mis-transcribed.)[JH1.1]       Tomasa Coppola MD  03/12/18 1917  [JH1.3]     Revision History        User Key Date/Time User Provider Type Action    > JH1.3 3/12/2018  7:17 PM Tomasa Coppola MD Physician Sign     JH1.2 3/12/2018  7:08 PM Tomasa Coppola MD Physician      JH1.1 3/12/2018  5:32 PM Tomasa Coppola MD Physician             ED Notes by Lennie Beverly RN at 3/12/2018  7:00 PM     Author:  Lennie Beverly RN Service:  (none) Author Type:  Registered Nurse    Filed:  3/12/2018  7:14 PM Date of Service:  3/12/2018  7:00 PM Creation Time:  3/12/2018  7:03 PM    Status:  Addendum :  Lennie Beverly RN (Registered Nurse)         Report given to Ev CALDERON[CP1.1], this writer was unable to reassess patient for pain or neuro due to being with another critical patient.[CP1.2]     Revision History        User Key Date/Time User Provider Type Action    > CP1.2 3/12/2018  7:14 PM Lennie Beverly RN Registered Nurse Addend     CP1.1 3/12/2018  7:03 PM Lennie Beverly RN Registered Nurse Sign            ED Notes by Lynda Stephenson RN at 3/12/2018  6:48 PM     Author:  Lynda Stephenson, RN Service:  (none) Author Type:  Registered Nurse    Filed:  3/12/2018  7:02 PM Date of Service:  3/12/2018  6:48 PM Creation Time:  3/12/2018  7:02 PM    Status:  Signed :  Lynda Stephenson, RN (Registered Nurse)         Assisted Dr Coppola with staples to back of head.  4  "staples placed.  Also stool sent for occult blood.[CK1.1]     Revision History        User Key Date/Time User Provider Type Action    > CK1.1 3/12/2018  7:02 PM Lynda Stephenson, RN Registered Nurse Sign            ED Notes by Lennie Beverly RN at 3/12/2018  6:07 PM     Author:  Lennie Beverly RN Service:  (none) Author Type:  Registered Nurse    Filed:  3/12/2018  6:07 PM Date of Service:  3/12/2018  6:07 PM Creation Time:  3/12/2018  6:07 PM    Status:  Signed :  Lennie Beverly RN (Registered Nurse)         To radiology dept.[CP1.1]      Revision History        User Key Date/Time User Provider Type Action    > CP1.1 3/12/2018  6:07 PM Lennie Beverly RN Registered Nurse Sign            ED Notes by Alvin Meneses at 3/12/2018  5:37 PM     Author:  Alvin Meneses Service:  (none) Author Type:  Nurse Aide/Asst    Filed:  3/12/2018  5:37 PM Date of Service:  3/12/2018  5:37 PM Creation Time:  3/12/2018  5:37 PM    Status:  Signed :  Alvin Meneses (Nurse Aide/Asst)         CT called will be 15 more min[LK1.1]      Revision History        User Key Date/Time User Provider Type Action    > LK1.1 3/12/2018  5:37 PM Alvin Meneses Nurse Aide/Asst Sign            ED Notes by Lennie Beverly RN at 3/12/2018  5:31 PM     Author:  Lennie Beverly RN Service:  (none) Author Type:  Registered Nurse    Filed:  3/12/2018  5:34 PM Date of Service:  3/12/2018  5:31 PM Creation Time:  3/12/2018  5:34 PM    Status:  Signed :  Lennie Beverly RN (Registered Nurse)         States \"Doctor has seen me a little while ago.  In ED for falling off a step stool and hitting the back of my head, denies neck pain, lost consciousness-does not know for how long. \"   and family at bedside.[CP1.1]      Revision History        User Key Date/Time User Provider Type Action    > CP1.1 3/12/2018  5:34 PM Lennie Beverly, RN Registered Nurse Sign            ED Notes by Deann Barkley RN at 3/12/2018  5:21 " PM     Author:  Storlie, Deann, RN Service:  Emergency Medicine Author Type:  Registered Nurse    Filed:  3/12/2018  5:22 PM Date of Service:  3/12/2018  5:21 PM Creation Time:  3/12/2018  5:22 PM    Status:  Signed :  Deann Barkley RN (Registered Nurse)         MD at bedside. Daughters and  at bedside.[PS1.1]     Revision History        User Key Date/Time User Provider Type Action    > PS1.1 3/12/2018  5:22 PM Deann Barkley RN Registered Nurse Sign            ED Notes by Deann Barkley RN at 3/12/2018  5:16 PM     Author:  Storlie, Deann, RN Service:  Emergency Medicine Author Type:  Registered Nurse    Filed:  3/12/2018  5:17 PM Date of Service:  3/12/2018  5:16 PM Creation Time:  3/12/2018  5:17 PM    Status:  Signed :  Deann Barkley RN (Registered Nurse)         Trauma Eval, provider notified.[PS1.1]     Revision History        User Key Date/Time User Provider Type Action    > PS1.1 3/12/2018  5:17 PM Deann Barkley RN Registered Nurse Sign                  Procedure Notes     No notes of this type exist for this encounter.      Progress Notes - Therapies (Notes from 03/11/18 through 03/14/18)     No notes of this type exist for this encounter.

## 2018-03-13 LAB
ANION GAP SERPL CALCULATED.3IONS-SCNC: 7 MMOL/L (ref 3–14)
BASOPHILS # BLD AUTO: 0 10E9/L (ref 0–0.2)
BASOPHILS NFR BLD AUTO: 0.8 %
BLD PROD TYP BPU: NORMAL
BLD PROD TYP BPU: NORMAL
BLD UNIT ID BPU: 0
BLD UNIT ID BPU: 0
BLOOD PRODUCT CODE: NORMAL
BLOOD PRODUCT CODE: NORMAL
BPU ID: NORMAL
BPU ID: NORMAL
BUN SERPL-MCNC: 11 MG/DL (ref 7–30)
CALCIUM SERPL-MCNC: 8 MG/DL (ref 8.5–10.1)
CHLORIDE SERPL-SCNC: 108 MMOL/L (ref 94–109)
CO2 SERPL-SCNC: 25 MMOL/L (ref 20–32)
COPATH REPORT: NORMAL
CREAT SERPL-MCNC: 0.85 MG/DL (ref 0.52–1.04)
DIFFERENTIAL METHOD BLD: ABNORMAL
EOSINOPHIL # BLD AUTO: 0 10E9/L (ref 0–0.7)
EOSINOPHIL NFR BLD AUTO: 1.6 %
ERYTHROCYTE [DISTWIDTH] IN BLOOD BY AUTOMATED COUNT: 23.9 % (ref 10–15)
FOLATE SERPL-MCNC: 36.5 NG/ML
GFR SERPL CREATININE-BSD FRML MDRD: 65 ML/MIN/1.7M2
GLUCOSE BLDC GLUCOMTR-MCNC: 88 MG/DL (ref 70–99)
GLUCOSE SERPL-MCNC: 89 MG/DL (ref 70–99)
HCT VFR BLD AUTO: 30.5 % (ref 35–47)
HGB BLD-MCNC: 9.2 G/DL (ref 11.7–15.7)
IMM GRANULOCYTES # BLD: 0 10E9/L (ref 0–0.4)
IMM GRANULOCYTES NFR BLD: 0.4 %
LYMPHOCYTES # BLD AUTO: 0.7 10E9/L (ref 0.8–5.3)
LYMPHOCYTES NFR BLD AUTO: 29.8 %
MCH RBC QN AUTO: 20.6 PG (ref 26.5–33)
MCHC RBC AUTO-ENTMCNC: 30.2 G/DL (ref 31.5–36.5)
MCV RBC AUTO: 68 FL (ref 78–100)
MONOCYTES # BLD AUTO: 0.2 10E9/L (ref 0–1.3)
MONOCYTES NFR BLD AUTO: 8.5 %
NEUTROPHILS # BLD AUTO: 1.5 10E9/L (ref 1.6–8.3)
NEUTROPHILS NFR BLD AUTO: 58.9 %
NRBC # BLD AUTO: 0 10*3/UL
NRBC BLD AUTO-RTO: 0 /100
PLATELET # BLD AUTO: 94 10E9/L (ref 150–450)
POTASSIUM SERPL-SCNC: 3.9 MMOL/L (ref 3.4–5.3)
RBC # BLD AUTO: 4.46 10E12/L (ref 3.8–5.2)
SODIUM SERPL-SCNC: 140 MMOL/L (ref 133–144)
TRANSFUSION STATUS PATIENT QL: NORMAL
VIT B12 SERPL-MCNC: 322 PG/ML (ref 193–986)
WBC # BLD AUTO: 2.5 10E9/L (ref 4–11)

## 2018-03-13 PROCEDURE — 36415 COLL VENOUS BLD VENIPUNCTURE: CPT | Performed by: INTERNAL MEDICINE

## 2018-03-13 PROCEDURE — 00000146 ZZHCL STATISTIC GLUCOSE BY METER IP

## 2018-03-13 PROCEDURE — P9016 RBC LEUKOCYTES REDUCED: HCPCS | Performed by: INTERNAL MEDICINE

## 2018-03-13 PROCEDURE — 25000132 ZZH RX MED GY IP 250 OP 250 PS 637: Mod: GY | Performed by: INTERNAL MEDICINE

## 2018-03-13 PROCEDURE — 80048 BASIC METABOLIC PNL TOTAL CA: CPT | Performed by: INTERNAL MEDICINE

## 2018-03-13 PROCEDURE — 25000128 H RX IP 250 OP 636: Performed by: INTERNAL MEDICINE

## 2018-03-13 PROCEDURE — 99232 SBSQ HOSP IP/OBS MODERATE 35: CPT | Performed by: INTERNAL MEDICINE

## 2018-03-13 PROCEDURE — 85025 COMPLETE CBC W/AUTO DIFF WBC: CPT | Performed by: INTERNAL MEDICINE

## 2018-03-13 PROCEDURE — 99222 1ST HOSP IP/OBS MODERATE 55: CPT | Performed by: SURGERY

## 2018-03-13 PROCEDURE — 12000000 ZZH R&B MED SURG/OB

## 2018-03-13 PROCEDURE — A9270 NON-COVERED ITEM OR SERVICE: HCPCS | Mod: GY | Performed by: INTERNAL MEDICINE

## 2018-03-13 RX ORDER — VENLAFAXINE HYDROCHLORIDE 75 MG/1
75 CAPSULE, EXTENDED RELEASE ORAL
Status: DISCONTINUED | OUTPATIENT
Start: 2018-03-13 | End: 2018-03-14 | Stop reason: HOSPADM

## 2018-03-13 RX ORDER — CEFTRIAXONE SODIUM 1 G/50ML
1 INJECTION, SOLUTION INTRAVENOUS EVERY 24 HOURS
Status: DISCONTINUED | OUTPATIENT
Start: 2018-03-13 | End: 2018-03-13

## 2018-03-13 RX ORDER — VENLAFAXINE HYDROCHLORIDE 75 MG/1
75 CAPSULE, EXTENDED RELEASE ORAL DAILY
COMMUNITY

## 2018-03-13 RX ORDER — LEVOFLOXACIN 250 MG/1
250 TABLET, FILM COATED ORAL DAILY
Status: DISCONTINUED | OUTPATIENT
Start: 2018-03-13 | End: 2018-03-14 | Stop reason: HOSPADM

## 2018-03-13 RX ADMIN — HYDROCODONE BITARTRATE AND ACETAMINOPHEN 1 TABLET: 5; 325 TABLET ORAL at 21:14

## 2018-03-13 RX ADMIN — HYDROCODONE BITARTRATE AND ACETAMINOPHEN 1 TABLET: 5; 325 TABLET ORAL at 06:44

## 2018-03-13 RX ADMIN — VENLAFAXINE HYDROCHLORIDE 75 MG: 75 CAPSULE, EXTENDED RELEASE ORAL at 10:00

## 2018-03-13 RX ADMIN — SIMVASTATIN 20 MG: 20 TABLET, FILM COATED ORAL at 21:14

## 2018-03-13 RX ADMIN — LEVOFLOXACIN 250 MG: 250 TABLET, FILM COATED ORAL at 21:20

## 2018-03-13 RX ADMIN — DOCUSATE SODIUM 100 MG: 100 CAPSULE, LIQUID FILLED ORAL at 10:00

## 2018-03-13 RX ADMIN — CEFTRIAXONE SODIUM 1 G: 1 INJECTION, SOLUTION INTRAVENOUS at 08:05

## 2018-03-13 RX ADMIN — LEVOTHYROXINE SODIUM 50 MCG: 50 TABLET ORAL at 06:44

## 2018-03-13 RX ADMIN — OMEPRAZOLE 20 MG: 20 CAPSULE, DELAYED RELEASE ORAL at 16:40

## 2018-03-13 RX ADMIN — OMEPRAZOLE 20 MG: 20 CAPSULE, DELAYED RELEASE ORAL at 06:44

## 2018-03-13 RX ADMIN — POTASSIUM CHLORIDE 40 MEQ: 20 TABLET, EXTENDED RELEASE ORAL at 21:14

## 2018-03-13 RX ADMIN — POTASSIUM CHLORIDE 40 MEQ: 20 TABLET, EXTENDED RELEASE ORAL at 10:00

## 2018-03-13 ASSESSMENT — VISUAL ACUITY
OU: BASELINE

## 2018-03-13 ASSESSMENT — PAIN DESCRIPTION - DESCRIPTORS: DESCRIPTORS: SORE

## 2018-03-13 NOTE — H&P
[facility]    History and Physical  Hospitalist       Date of Admission:  3/12/2018    Assessment & Plan   Gina Beal is a 77 year old female who presents with syncope, fall, scalp laceration and found with pancytopenia. Admitted for transfusion, syncope follow up.    Active Problems:   Pancytopenia    Assessment: this is part pancytopenia. She denies and -ve on exam blood loosing features, except RLQ pain with some obstipation    Plan: Will as pathology peripheral smear to look for dysplastic features (pseudo Pelger-Huet anomaly or neutrophyl hypersegmentation. May need cytogenetic studies or bone marrow but this will be decided by heamologist    Symptomatic Anemia, most likely the cause of syncope  See above.   R/O GI blood loss. CT will be done    Syncope:  Assessment; most likely mechanical fall due to anemia. I could not get abn exam or worrisome feature on ROS.   Will keep her on tele and will check orthostatic VS      Hypokalemia    Assessment: most likely due to loop diuretics and HCTZ she is taking.     Plan: will replace. Will hold diuretics for now. Will rec not to restart HCTZ on Discharge    Depression    Assessment: due to family dynamics. Admits being depressed.     Plan: Continue antidepressants.       Osteoarthritis    Assessment: mild-moderate, but she is on norco    Plan: will continue norco    Scalp laceration  - sutured in ED  - per admission protocol, trauma surgeon will be notified in AM (dr. Gaines is on call)  # Pain Assessment: pt takes norco for her chronic pain and this will be continued  Current Pain Score 3/12/2018 3/12/2018 3/12/2018   Patient currently in pain? denies - yes   Pain score (0-10) 0 2 6   Pain location - - Head   Pain descriptors - - Sore   - Gina is experiencing pain due to chronic pain due to osteoarthritis. Pain management was discussed and the plan was created in a collaborative fashion.  Gina's response to the current recommendations:  "engaged  compliant  - Opioid regimen: norco home dose  - Response to opioid medications: Reduction of symptoms of pain  - Bowel regimen: senna    DVT Prophylaxis: Pneumatic Compression Devices  Code Status: Full Code    Disposition: Expected discharge in 1-2 stable (activing bleeding r/o and anemia controlled.    [facility]  Hospitalist in [place].     Primary Care Physician   Garrett Amaro    Chief Complaint   Fall, scalp laceration    RFA: fall, anemia requiring transfusion.     History is obtained from the patient, electronic health record, emergency department physician, patient's daughter and patient's spouse    History of Present Illness   Gina Beal is a 77 year old women with PMH of HTN, Osteoarthritis, UTI, depression, was brought to ED after falling at home. She fell from the 2nd step on the step stool. Did not feel any chest pain, palpitation, dizziness immediately before fall, but did not fell \"right\" all day or even a few days prior to admission. At the time of falling, she was cleaning her knickknacks and cleaning them. She denies heal overextending or working with elevated hands. The next thing she remembers- waking on the floor and blood all over. She denies tongue biting or being confused. She does not know how long she was down. She admits intermittent abd pain (RLQ), denies stool color changes, dysuria, hematuria, hematochezia, taking large doses of NSAIDS. Occasionally she gets constipated. Her appetite is not good but she was never a \"big eater\". The rest ROS is -ve. 14 points obtained.     Past Medical History    I have reviewed this patient's medical history and updated it with pertinent information if needed.   History reviewed. No pertinent past medical history.    Past Surgical History   I have reviewed this patient's surgical history and updated it with pertinent information if needed.  History reviewed. No pertinent surgical history.    Prior to Admission Medications   Prior " to Admission Medications   Prescriptions Last Dose Informant Patient Reported? Taking?   Furosemide (LASIX PO) 3/12/2018 at 0800  Yes Yes   Sig: Take 40 mg by mouth daily   HYDROCHLOROTHIAZIDE PO Unknown at Unknown time  Yes No   Sig: Take by mouth daily   HYDROcodone-acetaminophen (NORCO) 5-325 MG per tablet 3/12/2018 at 0800  Yes Yes   Sig: Take 1 tablet by mouth every 6 hours as needed   LORAZEPAM PO Unknown at Unknown time  Yes No   Sig: Take by mouth daily   Levothyroxine Sodium (SYNTHROID PO) 3/12/2018 at 0700  Yes Yes   Sig: Take 50 mcg by mouth daily    METFORMIN HCL PO 3/12/2018 at 0800  Yes Yes   Sig: Take 500 mg by mouth daily    Omeprazole (PRILOSEC PO) 3/12/2018 at 0700  Yes Yes   Sig: Take 20 mg by mouth 2 times daily (before meals)   Simvastatin (ZOCOR PO) 3/11/2018 at 1900  Yes Yes   Sig: Take 20 mg by mouth At Bedtime    Venlafaxine HCl (EFFEXOR PO) 3/12/2018 at 0800  Yes Yes   Sig: Take 75 mg by mouth daily   potassium chloride sveta er (K-DUR) 3/11/2018 at 1900  Yes Yes   Sig: Take 10 mEq by mouth once      Facility-Administered Medications: None     Allergies   Allergies   Allergen Reactions     Aspirin      Flexeril [Cyclobenzaprine]      Iodine      Sulfa Drugs        Social History   I have reviewed this patient's social history and updated it with pertinent information if needed. Gina Beal     She admits being a smoker and quit 12 years ago    Family History   I have reviewed this patient's family history and updated it with pertinent information if needed.   No family history on file.    Review of Systems   14 points obtained. Pertinent +ves and -ves included in HPI. The rest is -ve.     Physical Exam   Temp: 98  F (36.7  C) Temp src: Tympanic BP: 165/57 Pulse: 76 Heart Rate: 86 Resp: 16 SpO2: 96 % O2 Device: None (Room air)    Vital Signs with Ranges  Temp:  [97.7  F (36.5  C)-98  F (36.7  C)] 98  F (36.7  C)  Pulse:  [72-76] 76  Heart Rate:  [86] 86  Resp:  [16-18] 16  BP:  (128-165)/(49-63) 165/57  SpO2:  [96 %-97 %] 96 %  139 lbs 15.87 oz    Physical exam:   General: not in distress pleasant woment  Scalp lacerated. conjuntivae pale  Neck:no JVD, no bruits  Card: regular, no M/G/R. PMI not displaced  Lungs: clear bilaterally  Abd; soft, non-distended, not tender, no HSM, no pulsatile masses.   : bladder not palpable.   Neurological: non-focal motor and sensory. Delayed relaxation phase of DTR.   Skin: warm, dry, no rash  Psychiatric: A&O x 4, pleasant.     Data   Data reviewed today:  I personally reviewed the EKG tracing showing NSR and the XR and CT - no acute pathology image(s) showing no acute pathology.    Recent Labs  Lab 03/12/18  1744   WBC 2.6*   HGB 7.0*   MCV 63*   *   INR 1.12      POTASSIUM 3.2*   CHLORIDE 107   CO2 25   BUN 13   CR 0.88   ANIONGAP 9   CIPRIANO 8.2*   *   ALBUMIN 3.6   PROTTOTAL 7.4   BILITOTAL 0.7   ALKPHOS 150   ALT 26   AST 35   TROPI <0.015       No results found for this or any previous visit (from the past 24 hour(s)).

## 2018-03-13 NOTE — PLAN OF CARE
Problem: Patient Care Overview  Goal: Plan of Care/Patient Progress Review  Outcome: No Change  Vitals: VSS. RA at 95%. Afebrile. /68  Pulse 71  Temp 98.4  F (36.9  C) (Tympanic)  Resp 16  Ht 1.524 m (5')  Wt 63.5 kg (139 lb 15.9 oz)  SpO2 95%  BMI 27.34 kg/m2    Pain: Denied    Orientation: A&O.    Cardiac: Tele monitor on reading SR 70's.     Lungs: Clear    ABD: Bowels Active.    Urinating: WNL. Adequate amount out.     Skin: Wound to back of head, staples and open to air. Scattered bruising.     IV fluids: 75 of NS.     Antibiotics: None    Mobility: Up with assist of 1. Gait belt used. Steady on feet. Denies dizziness.     Safety: Bed alarm on. Call light in reach. Rounding done.     Comment: Slept well throughout night. Infused 3 units of blood this shift; pt tolerated well.       Face to face report given with opportunity to observe patient.    Report given to Jenny Lovett   3/13/2018  8:13 AM

## 2018-03-13 NOTE — ED NOTES
Assisted Dr Coppola with staples to back of head.  4 staples placed.  Also stool sent for occult blood.

## 2018-03-13 NOTE — PLAN OF CARE
Face to face report given with opportunity to observe patient.    Report given to YUMIKO Solorzano   3/13/2018  12:04 AM

## 2018-03-13 NOTE — PLAN OF CARE
Face to face report given with opportunity to observe patient.    Report given to Abhay Reinoso   3/13/2018  3:49 PM

## 2018-03-13 NOTE — DISCHARGE INSTRUCTIONS
What to expect when you have contrast    During your exam, we will inject  contrast  into your vein or artery. (Contrast is a clear liquid with iodine in it. It shows up on X-rays.)    You may feel warm or hot. You may have a metal taste in your mouth and a slight upset stomach. You may also feel pressure near the kidneys and bladder. These effects will last about 1 to 3 minutes.    Please tell us if you have:    Sneezing     Itching    Hives     Swelling in the face    A hoarse voice    Breathing problems    Other new symptoms    Serious problems are rare.  They may include:    Irregular heartbeat     Seizures    Kidney failure              Tissue damage    Shock      Death    If you have any problems during the exam, we  will treat them right away.    When you get home    Call your hospital if you have any new symptoms in the next 2 days, like hives or swelling. (Phone numbers are at the bottom of this page.) Or call your family doctor.     If you have wheezing or trouble breathing, call 911.    Self-care  -Drink at least 4 extra glasses of water today.   This reduces the stress on your kidneys.  -Keep taking your regular medicines.    The contrast will pass out of your body in your  Urine(pee). This will happen in the next 24 hours. You  will not feel this. Your urine will not  change color.    If you have kidney problems or take metformin    Drink 4 to 8 large glasses of water for the next  2 days, if you are not on a fluid restriction.    ?If you take metformin (Glucophage or Glucovance) for diabetes, keep taking it.      ?Your kidney function tests are abnormal.  If you take Metformin, do not take it for 48 hours. Please go to your clinic for a blood test within 3 days after your exam before the restarting this medicine.     (Note to provider:please give patient prescription for lab tests.)    ?Special instructions:     I have read and understand the above information.    Patient Sign  Here:______________________________________Date:________Time:______    Staff Sign Here:________________________________________Date:_______Time:______      Radiology Departments:     ?[facility]: [phone] ?[facility]: [phone]     ?[place]: [phone] ?[facility]:[phone]      ?[facility]: [phone]  ?[place]: [phone]  ?[place]:[phone]    ?[facility]:[phone]  ?[facility]:[phone]      You have a follow up appointment scheduled with Dr. Amaro on Tuesday March 20, 2018 at 11:30am at the [facility]. You will need to set up an appointment through Dr. Amaro at this appointment for an endoscopy/ colonoscopy, have your staples removed and have a lab appointment to recheck your CBC.If you have any questions regarding the appointment please call the clinic at [phone].    You have an appointment scheduled with Dr. Lott on Thursday March 22, 2018 at 8:30am at the [address]. The address to this clinic is [address] [place] [address] and the telephone number is [phone] if you have any questions regarding this appointment.

## 2018-03-13 NOTE — ED NOTES
Report to YUMIKO Escamilla and pt transferred to 3rd floor by UA. Pt to start drinking CT contrast at 2030, and Francine aware.

## 2018-03-13 NOTE — ED NOTES
Report given to Ev CALDERON, this writer was unable to reassess patient for pain or neuro due to being with another critical patient.

## 2018-03-13 NOTE — PHARMACY
[facility]    Pharmacy    Antimicrobial Stewardship Note     Current antimicrobial therapy:  Anti-infectives (Future)    Start     Dose/Rate Route Frequency Ordered Stop    03/13/18 0630  cefTRIAXone in d5w (ROCEPHIN) intermittent infusion 1 g      1 g  over 30 Minutes Intravenous EVERY 24 HOURS 03/13/18 0619          Indication: UTI    Days of Therapy: 1     Pertinent labs:  Creatinine   Creatinine   Date Value Ref [facility] Status   03/13/2018 0.85 0.52 - 1.04 mg/dL Final   03/12/2018 0.88 0.52 - 1.04 mg/dL Final     WBC   WBC   Date Value Ref [place] Status   03/13/2018 2.5 (L) 4.0 - 11.0 10e9/L Final   03/12/2018 2.2 (L) 4.0 - 11.0 10e9/L Final   03/12/2018 2.6 (L) 4.0 - 11.0 10e9/L Final     Procalcitonin No results found for: PCAL  CRP No results found for: CRP    Culture Results:     7-Day Micro Results      Procedure Component Value Units Date/Time     Active MRSA Surveillance Culture [[id number]] Collected: 03/12/18 2115     Order Status: Completed Lab Status: Preliminary result Updated: 03/13/18 0731     Specimen: Nares from Nose       Specimen Description Nares      Culture Micro Culture in progress        Recommendations/Interventions:  1. None at this time    Daly Delgado RPH  March 13, 2018

## 2018-03-13 NOTE — PROGRESS NOTES
Assessment completed see flowsheet.   ?   LOC: alert   Others present: Patient and Family   Dx: Anemia   ?   Lives with: Lives With: spouse   Living at: Living Arrangements: house   Equipment used: None    Support System: Description of Support System: Supportive, Involved   ?   ?   Primary PCP: Dr. Amaro   POA/Guardian: No    Health Care Directive: YES Will bring to health information to put on file.    Pharmacy: [facility]   : No   Homecare/County Services: No   ?   Adequate Resources for needs (housing, utilities, food/med): YES   Meds and appointments management: YES Self manages appointmentsand meds  Work: NO/Retired  Transportation: YES   ?   ADLs: Independent   Falls: Yes  Reason for falls risk: Other- fall d/t syncope which was d/t low hemoglobin   Able to Return to Prior Living Arrangements: YES   : NO   ?   ?   ?   ?   Goals: Return home   Barriers: none   Needs: None   DEISY: average   Discharge Plan: Return home   Met with Gina in her room for assessment. Her daughter Rosalind and granddaughter were present. Gina was laying in bed . Alert and oriented. Very pleasant. She lives in a home with her  Simon. Her 3 adult daughters live nearby . 2 in [place] and 1 in [place].  PCP Dr. Amaro.  Dentist Dr. Channing Mednia last seen 3-4 years ago.  Eye care Dr. Marilia Gale last seen 1 year ago.  No specialists.  Pharmacy [facility].  Manages her own appointments and meds. Uses no medical equipment. Does have a healthcare directive and will bring it in,  to have it on file here.   Independent with cares. Independent with ambulation. Does have a walker at home from an old knee surgery . Feels safe at home and home is well maintained. Steps and railings are in good shape. Has smoke and CO2 alarms. Does not have grab bars in the shower and suggested she consider these. Did fall which was d/t syncope caused by low hemoglobin. No other current falls. Shopping and food prep done by  Gina.  She doesn't drive, so Simon does the driving.   Sleeps good at night. States she has depression but is stable at this time . She takes an antidepressant for this. Non-smoker quit 15 yrs ago. Rarely uses alcohol. No illicit drug use. Holiness rose is important and attends [employer] in [place]. Recent stressor is Simon's dx of cancer but has a lot of support by family.  She feels very supported by her  , 3 daughters, grandaughter and her sister /brother. Spare time she cleans and bakes.   Gina indicates she has no needs to return home. No needs assessed by CTS. CTS will remain available.

## 2018-03-13 NOTE — PLAN OF CARE
Problem: Patient Care Overview  Goal: Plan of Care/Patient Progress Review  Outcome: No Change  Alert and oriented. VSS. Pt requested PTA norco for chronic pain, 1  Head laceration open to air with no further bleeding. Pt down to CT after drinking gastroview at 2120. Back to room at 2145. Orders received to transfuse 3 units. Pt still needed to sign consent with , thus delay in transfusion. No complaints of dizziness. Lungs clear. Neuros WDL. IV- NS 75ml/hr. Hgb redraw 6.5- Dr. Ayala called back within 5 minutes and placed orders. Family in room with pt since admission. Call light within reach. Night shift to begin 1st transfusions.    Problem: Anemia (Adult)  Goal: Identify Related Risk Factors and Signs and Symptoms  Related risk factors and signs and symptoms are identified upon initiation of Human Response Clinical Practice Guideline (CPG).  Outcome: No Change   03/12/18 2352   Anemia   Related Risk Factors (Anemia) other (see comments)   Signs and Symptoms (Anemia) other (see comments)     Goal: Symptom Improvement  Patient will demonstrate the desired outcomes by discharge/transition of care.  Outcome: No Change   03/12/18 2352   Anemia (Adult)   Symptom Improvement making progress toward outcome          self-care/home management self-care/home management

## 2018-03-13 NOTE — PROGRESS NOTES
[facility]    Hospitalist Progress Note      Assessment & Plan   Gina Beal is a 77 year old female who was admitted on 3/12/2018.      1.  Syncope with head laceration:   Patient was standing on a stool ranging her knickknacks and the next thing she knew she was on the floor.  Did suffer a small head laceration on her top of her head.  She had no chest pains or dizziness palpitations shortness of breath at all with this episode.  She is not sure if she lost her balance or tripped or exactly what happened.  She did have her head laceration stapled in the ER with 4 staples.  She has had no dysrhythmias overnight on telemetry.  Hemodynamically she has been stable.  Question of whether or not her anemia caused her episode is the question what and likely true.  We will get her up and ambulate her now that she has been transfused and see how she does.  Staples will be removed in 7-10 days.    2.  Anemia in setting of pancytopenia:   Her hemoglobin was 6.5.  She has been transfused 3 units of packed red blood cells her hemoglobin is now up to 9.2.  She is remained hemodynamically stable.  She denies any evidence of bleeding issues.  Has never thrown up any blood.  Has not noticed any black tarry stools or red stools at all.  Unfortunately her last hemoglobin check was back in 2013 at which time was 11.  Her stool was guaiac negative.  She definitely has iron deficiency.  With microcytosis.  A peripheral smear was done is pending at this time.  As above this does not appear to be an acute blood loss anemia.  In discussing with her she did have a colonoscopy however the last time that was performed I believe was almost 6+ years ago.  She did have a adenomatous polyp at that time.  Has never had any heartburn complaints dysphasia.  She does have a significant hiatal hernia noted on her CT scan.  Given her microcytosis and iron deficiency I think she will need to have a colonoscopy and endoscopy performed to  evaluate her.  This is not an acute need.  Have briefly discussed this with Dr. Barahona surgeon was saw her for her trauma.  We can set this up as an outpatient with a clinic visit.  Given her leukopenia and thrombocytopenia further evaluation may need to be entertained with possible bone marrow biopsy also.    3.  Left breast mass:   On CT scan there is noted a 3 cm mass.  The patient does have yearly mammograms.  No palpable masses noted currently.  We will need this further evaluated with repeat mammography in her primary care provider clinic with possible further evaluation biopsy etc. pending those results.    4.  Pyuria/UTI: She was given some IV Rocephin for this.  Her urine cultures pending at this time she has has no fever.    Diet: Combination Diet Low Saturated Fat Na <2400mg Diet, No Caffeine Diet  Fluids: IV lock    # Pain Assessment:   Current Pain Score 3/13/2018 3/13/2018 3/13/2018   Patient currently in pain? yes yes yes   Pain score (0-10) 2 2 4   Pain location Head Head Head   Pain descriptors Dull Dull Sore   - Gnia is experiencing pain due to chronic neck pain. Pain management was discussed and the plan was created in a collaborative fashion.  Gina's response to the current recommendations: engaged  compliant  - Opioid regimen: Norco  - Response to opioid medications: Reduction of symptoms   - Bowel regimen: not needed          DVT Prophylaxis: Pneumatic Compression Devices  Code Status: Full Code    Disposition: Expected discharge in 1-2 days once stable hgb.    Aman Phipps    Interval History   Patient is alert oriented.  Feels actually quite good.  Has a little sore from head laceration but no new aches or pains.  Denies any shortness of breath chest pain abdominal pain heartburn at all.  No peripheral edema no fevers chills sweats cough purulent sputum.  Once again she is not sure if she actually lost consciousness or she lost her balance when she fell from the stool.  Once again  she is very adamant she has not noted any black tarry stools blood in her stool but her urine.  No abdominal pain no significant heartburn symptoms.  She has been under a fair amount of stress given her 's recent diagnosis of cancer.    -Data reviewed today: I reviewed all new labs and imaging results over the last 24 hours. I personally reviewed no images or EKG's today.    Physical Exam   Temp: 98.3  F (36.8  C) Temp src: Tympanic BP: 135/64 Pulse: 71 Heart Rate: 69 Resp: 16 SpO2: 92 % O2 Device: None (Room air)    Vitals:    03/12/18 2024   Weight: 63.5 kg (139 lb 15.9 oz)     Vital Signs with Ranges  Temp:  [97.6  F (36.4  C)-98.6  F (37  C)] 98.3  F (36.8  C)  Pulse:  [71-76] 71  Heart Rate:  [63-86] 69  Resp:  [16-18] 16  BP: (112-165)/(49-68) 135/64  SpO2:  [92 %-97 %] 92 %  I/O last 3 completed shifts:  In: 942.5   Out: 300 [Urine:300]    Peripheral IV 03/12/18 Right Upper forearm (Active)   Site Assessment WDL 3/13/2018 10:13 AM   Line Status Saline locked 3/13/2018 10:13 AM   Phlebitis Scale 0-->no symptoms 3/13/2018 10:13 AM   Infiltration Scale 0 3/13/2018 10:13 AM   Number of days:1     No line/device    Constitutional: Alert and oriented x3. No distress    HEENT: Head laceration on the top of her head with 4 staples looks fine., PERRL, EOMI, mouth moist, neck supple, no LN.     Cardiovascular: RRR. no Murmur, no  rubs, or gallops.  JVD flat.  Bruits no.  Pulses 2+    Respiratory: Clear to auscultation bilaterally    Abdomen: Soft, NTND, no organomegaly. Bowel sounds present    Extremities: Warm/dry. No edema    Neuro:   Non focal, cranial nerves intact, Moves all extremities.  Medications       cefTRIAXone  1 g Intravenous Q24H     venlafaxine  75 mg Oral Daily with breakfast     diatrizoate meglumine-sodium  30 mL Oral Once     levothyroxine (SYNTHROID/LEVOTHROID) tablet 50 mcg  50 mcg Oral Daily     omeprazole (priLOSEC) CR capsule 20 mg  20 mg Oral BID AC     simvastatin (ZOCOR) tablet 20 mg   20 mg Oral At Bedtime     docusate sodium  100 mg Oral BID     potassium chloride  40 mEq Oral BID       Data     Recent Labs  Lab 03/13/18  0743 03/12/18  2056 03/12/18  1744   WBC 2.5* 2.2* 2.6*   HGB 9.2* 6.5* 7.0*   MCV 68* 63* 63*   PLT 94* 96* 110*   INR  --   --  1.12     --  141   POTASSIUM 3.9  --  3.2*   CHLORIDE 108  --  107   CO2 25  --  25   BUN 11  --  13   CR 0.85  --  0.88   ANIONGAP 7  --  9   CIPRIANO 8.0*  --  8.2*   GLC 89  --  119*   ALBUMIN  --   --  3.6   PROTTOTAL  --   --  7.4   BILITOTAL  --   --  0.7   ALKPHOS  --   --  150   ALT  --   --  26   AST  --   --  35   TROPI  --   --  <0.015       Recent Results (from the past 24 hour(s))   CT Head w/o Contrast    Narrative    Exam: CT HEAD W/O CONTRAST    Clinical history:77 years Female fall;     Comparisons:None    Technique: Axial CT imaging of the head was performed Without  intervenous contrast.    FINDINGS:   Ventricles and sulci are symmetric. The gray-white matter  differentiation throughout the brain is well maintained. There is  moderate periventricular white matter change of chronic small vessel  ischemic disease. There is no evidence of intracranial mass or  hemorrhage. Visualized portions of the paranasal sinuses and mastoid  air cells are well aerated. There is no evidence of skull fracture.    There is a posterior left para midline scalp laceration.      Impression    IMPRESSION: Posterior left para midline scalp wound. There is no  evidence of intracranial hemorrhage or skull fracture.    MATTIE CHARLES MD   CT Abdomen Pelvis w Contrast    Narrative    CT ABDOMEN PELVIS W CONTRAST    CLINICAL HISTORY: Female, age 77 years,  abdominal pain and anemia; ;    Comparison:  None.    TECHNIQUE:  CT was performed of the abdomen and pelvis with IV and  oral contrast. Sagittal, coronal and axial reconstructions were  reviewed.     FINDINGS:    Abdomen/Pelvis CT:  Lung Bases:  Mild centrilobular emphysema.    There is asymmetry in  density breast tissue within the left breast  suggesting a 3 cm mass.    Esophagus/stomach: Large hiatal hernia.    Liver:  Normal.  Portable venous system: Patent.  Gallbladder: Surgically absent    Spleen: Splenomegaly.    Pancreas: Normal.    Adrenal Glands: Normal.    Kidneys: 2.5 cm parapelvic cyst midpole left kidney.  Ureters: Grossly normal.  Urinary bladder: Normal.    Abdominal Aorta: Scattered atherosclerotic calcifications.  IVC: Normal.    Lymph Nodes: Normal-sized nodes within the mesentery and  retroperitoneum.    Large and Small Bowel: Moderate volume of stool. No acute abnormality.  Appendix: Not seen. No secondary signs of appendicitis.    Pelvic Organs:  Atrophic uterus.  Peritoneum: Normal.  Bony structures: Scattered degenerative changes. No evidence of acute  or concerning of normality. Degenerative spinal listhesis of L5  relative to S1 with bilateral foraminal stenosis.    Other Findings:  Inguinal lymph nodes are prominent in size, at the  upper limits of normal.      Impression    IMPRESSION:  1.   Asymmetry of tissue within the left breast, concerning for a 3 cm  left breast mass.    2.  Splenomegaly with a number of normal-sized nodes throughout the  retroperitoneum and prominent nodes within the inguinal regions.    3.  Surgical absence of the gallbladder with slight prominence of the  intrahepatic portions of biliary tree.    This report is in agreement with the preliminary report.    ISAAC MARKHAM MD

## 2018-03-13 NOTE — PLAN OF CARE
Problem: Patient Care Overview  Goal: Plan of Care/Patient Progress Review  Outcome: Improving  Pt. Has been afebrile through the day, VS as charted.  Her O2 sats are in the low to mid 90's on RA, lung sounds are clear.  She did have a headache this morning - was medicated on previous shift - stated it did bring her pain/discomfort down to 2/10 - no further requests for medications.  Her appetite has been good through the day, no complaints of nausea/vomiting.  She does get up and ambulate to and from the bathroom, voiding well, did have a stool this afternoon, no complaints of lightheadedness, no dizziness, steady gait.  Her fluids were stopped - saline locked.  Laceration on back of her head is intact - staples present, no drainage noted. She has remained free of falls through the day, call light within reach - does make her needs known, family present most of the day, frequent rounding done to assess needs.

## 2018-03-13 NOTE — CONSULTS
[facility]    History and Physical / Consult note: Trauma Service     Date of Admission:  3/12/2018    Time of Admission/Consult Request (page/call): [phone]    Time of my evaluation: 0715  3/13/3018  Consulting services: None    Assessment & Plan   Trauma mechanism: Same level fall with loss of conciousness  Time/date of injury: 3/12/2018 arrival to ER at 1734 pm  Known Injuries:  1. Cephalohematoma to posterior occiput and laceration  Other diagnoses:   1. Splenomegaly  2. Anemia of unknown origin  3. Left breast mass 3 cm     Plan:  1. Recommend continued workup for anemia.   2. Will need evaluation for new breast mass with mammogram and possible biopsy      General Cares:    DVT Prophylaxis: SCDs  Date of last stool/Bowel Regimen: Docusate  Pulmonary toilet: Incentive spirometry    ETOH: This patient was asked if in the last 3-6 months there has been a time when she had 4 or more drinks in a single day/outing.. Patient answer to the screening question was in the negative. No intervention needed.  Primary Care Physician   Garrett Amaro    Chief Complaint   Same level fall with loss of conciousness    History is obtained from the patient    History of Present Illness   Gina Beal is a 77 year old female who presents with loss of conciousness and a same level fall while at home. She says that yesterday she was moving items around in her house and the next thing she knew she was on the floor. She says that she had not noticed and lightheadedness, dizziness and does not remember passing out. She had hit her head during the fall and had bleeding from her posterior scalp. She also had an abrasion to her left hand. She has not had any episodes of lightheadedness or dizziness in the past. She was seen in the emergency room where the posterior scalp laceration was stapled closed.    Past Medical History    Past medical history reviewed with no previously diagnosed medical problems.    Past Surgical  History   Past surgical history reviewed with no previous surgeries identified.  Prior to Admission Medications   Prior to Admission Medications   Prescriptions Last Dose Informant Patient Reported? Taking?   Furosemide (LASIX PO) 3/12/2018 at 0800  Yes Yes   Sig: Take 40 mg by mouth daily   HYDROCHLOROTHIAZIDE PO Unknown at Unknown time  Yes No   Sig: Take by mouth daily   HYDROcodone-acetaminophen (NORCO) 5-325 MG per tablet 3/12/2018 at 0800  Yes Yes   Sig: Take 1 tablet by mouth every 6 hours as needed   LORAZEPAM PO Unknown at Unknown time  Yes No   Sig: Take by mouth daily   Levothyroxine Sodium (SYNTHROID PO) 3/12/2018 at 0700  Yes Yes   Sig: Take 50 mcg by mouth daily    METFORMIN HCL PO 3/12/2018 at 0800  Yes Yes   Sig: Take 500 mg by mouth daily    Omeprazole (PRILOSEC PO) 3/12/2018 at 0700  Yes Yes   Sig: Take 20 mg by mouth 2 times daily (before meals)   Simvastatin (ZOCOR PO) 3/11/2018 at 1900  Yes Yes   Sig: Take 20 mg by mouth At Bedtime    Venlafaxine HCl (EFFEXOR PO) 3/12/2018 at 0800  Yes Yes   Sig: Take 75 mg by mouth daily   potassium chloride sveta er (K-DUR) 3/11/2018 at 1900  Yes Yes   Sig: Take 10 mEq by mouth once      Facility-Administered Medications: None     Allergies   Allergies   Allergen Reactions     Aspirin      Flexeril [Cyclobenzaprine]      Iodine      Sulfa Drugs        Social History   Social History     Social History     Marital status:      Spouse name: N/A     Number of children: N/A     Years of education: N/A     Occupational History     Not on file.     Social History Main Topics     Smoking status: Not on file     Smokeless tobacco: Not on file     Alcohol use Not on file     Drug use: Not on file     Sexual activity: Not on file     Other Topics Concern     Not on file     Social History Narrative     No narrative on file       Family History   Family history reviewed with patient and is noncontributory.    Review of Systems   CONSTITUTIONAL: No fever, chills,  sweats, fatigue   EYES: no visual blurring, no double vision or visual loss  ENT: no decrease in hearing, no tinnitus, no vertigo, no hoarseness  RESPIRATORY: no shortness of breath, no cough, no sputum   CARDIOVASCULAR: no palpitations, no chest  pain, no exertional chest pain or pressure  GASTROINTESTINAL: no nausea or vomiting, or abd pain  GENITOURINARY: no dysuria, no frequency or hesitancy, no hematuria  MUSCULOSKELETAL: no weakness, no redness, no swelling, no joint pain,   SKIN: no rashes, ecchymoses  NEUROLOGIC: no numbness or tingling of hands, no numbness or tingling  of feet, no syncope, no tremors or weakness  PSYCHIATRIC: no sleep disturbances, no anxiety or depression    Physical Exam   Temp: 98.4  F (36.9  C) Temp src: Tympanic BP: 134/68 Pulse: 71 Heart Rate: 65 Resp: 16 SpO2: 93 % O2 Device: None (Room air)    Vital Signs with Ranges  Temp:  [97.6  F (36.4  C)-98.6  F (37  C)] 98.4  F (36.9  C)  Pulse:  [71-76] 71  Heart Rate:  [63-86] 65  Resp:  [16-18] 16  BP: (112-165)/(49-68) 134/68  SpO2:  [92 %-97 %] 93 % 139 lbs 15.87 oz    Primary Survey:  Airway: patient talking  Breathing: symmetric respiratory effort bilaterally  Circulation: central pulses present and peripheral pulses present  Disability: Pupils - left 4 mm and brisk, right 4 mm and brisk     Lauro Coma Scale - Total 15/15  Eye Response (E): 4  4= spontaneous,  3= to verbal/voice, 2=  to pain, 1= No response   Verbal Response (V): 5   5= Orientated, converses,  4= Confused, converses, 3= Inappropriate words,  2= Incomprehensible sounds,  1=No response   Motor Response (M): 6   6= Obeys commands, 5= Localizes to pain, 4= Withdrawal to pain, 3=Fexion to pain, 2= Extension to pain, 1= No response    Secondary Survey:  General: alert, oriented to person, place, time  Head: 1.5 cm laceration with staples intact on the posterior scalp with associated cephlohematoma, trachea midline  Eyes: PERRLA, pupils 4mm, EOMI, corneas and conjunctivae  clear  Ears: TMs obstructed with cerumen and non-inflamed external ear canals  Nose: nares patent, no drainage, nasal septum non-tender  Mouth/Throat: no exudates or erythema,  no dental tenderness or malocclusions, no tongue lacerations  Neck:  No cervical collar present. No midline posterior tenderness, full AROM without pain or tenderness   Chest/Pulmonary: normal respiratory rate and rhythm,  bilateral clear breath sounds, no wheezes, rales or rhonchi, no palpable abnormalities  Cardiovascular: S1, S2,  normal and regular rate and rhythm, no murmurs  Abdomen: soft, non-tender, no guarding, no rebound tenderness and no tenderness to palpation  : normal external genitalia, pelvis stable to lateral compression, no salcedo, no urine assess  Back/Spine: no deformity, no midline tenderness, no sacral tenderness,  no step-offs and no abrasions or contusions  Musculoskel/Extremities: normal extremities, full AROM of major joints without tenderness, edema, erythema, ecchymosis, or abrasions.   Hand: no gross deformities of hands or fingers. Full AROM of hand and fingers in flexion and extension.  strength equal and symmetric.   Skin: no rashes, laceration, ecchymosis, skin warm and dry.   Neuro: PERRLA, alert, oriented x 3. CN II-XII grossly intact. No focal deficits. Strength 5/5 x 4 extremities.  Sensation intact.  Psychiatric: affect/mood normal, cooperative, normal judgement/insight and memory intact    Data   UA RESULTS:  Recent Labs   Lab Test  03/12/18   2115   COLOR  Yellow   APPEARANCE  Slightly Cloudy   URINEGLC  Negative   URINEBILI  Negative   URINEKETONE  Negative   SG  1.013   UBLD  Negative   URINEPH  7.0   PROTEIN  Negative   NITRITE  Positive*   LEUKEST  Large*   RBCU  1   WBCU  >182*      Results for orders placed or performed during the hospital encounter of 03/12/18 (from the past 24 hour(s))   CBC with platelets differential   Result Value Ref Range    WBC 2.6 (L) 4.0 - 11.0 10e9/L    RBC Count  3.95 3.8 - 5.2 10e12/L    Hemoglobin 7.0 (L) 11.7 - 15.7 g/dL    Hematocrit 25.0 (L) 35.0 - 47.0 %    MCV 63 (L) 78 - 100 fl    MCH 17.7 (L) 26.5 - 33.0 pg    MCHC 28.0 (L) 31.5 - 36.5 g/dL    RDW 20.1 (H) 10.0 - 15.0 %    Platelet Count 110 (L) 150 - 450 10e9/L    Diff Method Automated Method     % Neutrophils 57.1 %    % Lymphocytes 31.8 %    % Monocytes 8.0 %    % Eosinophils 2.3 %    % Basophils 0.4 %    % Immature Granulocytes 0.4 %    Nucleated RBCs 0 0 /100    Absolute Neutrophil 1.5 (L) 1.6 - 8.3 10e9/L    Absolute Lymphocytes 0.8 0.8 - 5.3 10e9/L    Absolute Monocytes 0.2 0.0 - 1.3 10e9/L    Absolute Eosinophils 0.1 0.0 - 0.7 10e9/L    Absolute Basophils 0.0 0.0 - 0.2 10e9/L    Abs Immature Granulocytes 0.0 0 - 0.4 10e9/L    Absolute Nucleated RBC 0.0     Elliptocytes P    Comprehensive metabolic panel   Result Value Ref Range    Sodium 141 133 - 144 mmol/L    Potassium 3.2 (L) 3.4 - 5.3 mmol/L    Chloride 107 94 - 109 mmol/L    Carbon Dioxide 25 20 - 32 mmol/L    Anion Gap 9 3 - 14 mmol/L    Glucose 119 (H) 70 - 99 mg/dL    Urea Nitrogen 13 7 - 30 mg/dL    Creatinine 0.88 0.52 - 1.04 mg/dL    GFR Estimate 63 >60 mL/min/1.7m2    GFR Estimate If Black 76 >60 mL/min/1.7m2    Calcium 8.2 (L) 8.5 - 10.1 mg/dL    Bilirubin Total 0.7 0.2 - 1.3 mg/dL    Albumin 3.6 3.4 - 5.0 g/dL    Protein Total 7.4 6.8 - 8.8 g/dL    Alkaline Phosphatase 150 40 - 150 U/L    ALT 26 0 - 50 U/L    AST 35 0 - 45 U/L   INR   Result Value Ref Range    INR 1.12 0.80 - 1.20   Troponin I   Result Value Ref Range    Troponin I ES <0.015 0.000 - 0.045 ug/L   CT Head w/o Contrast    Narrative    Exam: CT HEAD W/O CONTRAST    Clinical history:77 years Female fall;     Comparisons:None    Technique: Axial CT imaging of the head was performed Without  intervenous contrast.    FINDINGS:   Ventricles and sulci are symmetric. The gray-white matter  differentiation throughout the brain is well maintained. There is  moderate periventricular white  matter change of chronic small vessel  ischemic disease. There is no evidence of intracranial mass or  hemorrhage. Visualized portions of the paranasal sinuses and mastoid  air cells are well aerated. There is no evidence of skull fracture.    There is a posterior left para midline scalp laceration.      Impression    IMPRESSION: Posterior left para midline scalp wound. There is no  evidence of intracranial hemorrhage or skull fracture.    MATTIE CHARLES MD   Immunos occult blood   Result Value Ref Range    Occult Blood Slide 1 Negative NEG^Negative   Ferritin   Result Value Ref Range    Ferritin 5 (L) 8 - 252 ng/mL   Iron and iron binding capacity   Result Value Ref Range    Iron 14 (L) 35 - 180 ug/dL    Iron Binding Cap 592 (H) 240 - 430 ug/dL    Iron Saturation Index 2 (L) 15 - 46 %   Lactate Dehydrogenase   Result Value Ref Range    Lactate Dehydrogenase 191 81 - 234 U/L   TSH with free T4 reflex   Result Value Ref Range    TSH 1.91 0.40 - 4.00 mU/L   CBC with platelets differential   Result Value Ref Range    WBC 2.2 (L) 4.0 - 11.0 10e9/L    RBC Count 3.71 (L) 3.8 - 5.2 10e12/L    Hemoglobin 6.5 (LL) 11.7 - 15.7 g/dL    Hematocrit 23.5 (L) 35.0 - 47.0 %    MCV 63 (L) 78 - 100 fl    MCH 17.5 (L) 26.5 - 33.0 pg    MCHC 27.7 (L) 31.5 - 36.5 g/dL    RDW 20.0 (H) 10.0 - 15.0 %    Platelet Count 96 (L) 150 - 450 10e9/L    Diff Method Automated Method     % Neutrophils 59.4 %    % Lymphocytes 31.3 %    % Monocytes 6.9 %    % Eosinophils 1.4 %    % Basophils 0.5 %    % Immature Granulocytes 0.5 %    Nucleated RBCs 0 0 /100    Absolute Neutrophil 1.3 (L) 1.6 - 8.3 10e9/L    Absolute Lymphocytes 0.7 (L) 0.8 - 5.3 10e9/L    Absolute Monocytes 0.2 0.0 - 1.3 10e9/L    Absolute Eosinophils 0.0 0.0 - 0.7 10e9/L    Absolute Basophils 0.0 0.0 - 0.2 10e9/L    Abs Immature Granulocytes 0.0 0 - 0.4 10e9/L    Absolute Nucleated RBC 0.0    Reticulocyte Count   Result Value Ref Range    % Retic 1.5 0.5 - 2.0 %    Absolute Retic  57.1 25 - 95 10e9/L   ABO/Rh type and screen   Result Value Ref Range    ABO O     RH(D) Pos     Antibody Screen Neg     Test Valid Only At [facility]        Specimen Expires 03/15/2018    Blood component   Result Value Ref Range    Unit Number [id number]     Blood Component Type Red Blood Cells Leukocyte Reduced     Division Number 00     Status of Unit Released to care unit     Blood Product Code [id number]     Unit Status ISS    Blood component   Result Value Ref Range    Unit Number [id number]     Blood Component Type Red Blood Cells Leukocyte Reduced     Division Number 00     Status of Unit Released to care unit 03/13/2018 0157     Blood Product Code D6556Y21     Unit Status ISS    Blood component   Result Value Ref Range    Unit Number [id number]     Blood Component Type Red Blood Cells LeukoReduced (Part 2)     Division Number 00     Status of Unit Released to care unit 03/13/2018 0424     Blood Product Code [id number]     Unit Status ISS    UA with Microscopic   Result Value Ref Range    Color Urine Yellow     Appearance Urine Slightly Cloudy     Glucose Urine Negative NEG^Negative mg/dL    Bilirubin Urine Negative NEG^Negative    Ketones Urine Negative NEG^Negative mg/dL    Specific Gravity Urine 1.013 1.003 - 1.035    Blood Urine Negative NEG^Negative    pH Urine 7.0 4.7 - 8.0 pH    Protein Albumin Urine Negative NEG^Negative mg/dL    Urobilinogen mg/dL 2.0 0.0 - 2.0 mg/dL    Nitrite Urine Positive (A) NEG^Negative    Leukocyte Esterase Urine Large (A) NEG^Negative    Source Midstream Urine     WBC Urine >182 (H) 0 - 5 /HPF    RBC Urine 1 0 - 2 /HPF    Bacteria Urine Few (A) NEG^Negative /HPF   Active MRSA Surveillance Culture   Result Value Ref Range    Specimen Description Nares     Culture Micro Culture in progress    CT Abdomen Pelvis w Contrast    Narrative    CT ABDOMEN PELVIS W CONTRAST    CLINICAL HISTORY: Female, age 77 years,  abdominal pain and anemia; ;    Comparison:   None.    TECHNIQUE:  CT was performed of the abdomen and pelvis with IV and  oral contrast. Sagittal, coronal and axial reconstructions were  reviewed.     FINDINGS:    Abdomen/Pelvis CT:  Lung Bases:  Mild centrilobular emphysema.    There is asymmetry in density breast tissue within the left breast  suggesting a 3 cm mass.    Esophagus/stomach: Large hiatal hernia.    Liver:  Normal.  Portable venous system: Patent.  Gallbladder: Surgically absent    Spleen: Splenomegaly.    Pancreas: Normal.    Adrenal Glands: Normal.    Kidneys: 2.5 cm parapelvic cyst midpole left kidney.  Ureters: Grossly normal.  Urinary bladder: Normal.    Abdominal Aorta: Scattered atherosclerotic calcifications.  IVC: Normal.    Lymph Nodes: Normal-sized nodes within the mesentery and  retroperitoneum.    Large and Small Bowel: Moderate volume of stool. No acute abnormality.  Appendix: Not seen. No secondary signs of appendicitis.    Pelvic Organs:  Atrophic uterus.  Peritoneum: Normal.  Bony structures: Scattered degenerative changes. No evidence of acute  or concerning of normality. Degenerative spinal listhesis of L5  relative to S1 with bilateral foraminal stenosis.    Other Findings:  Inguinal lymph nodes are prominent in size, at the  upper limits of normal.      Impression    IMPRESSION:  1.   Asymmetry of tissue within the left breast, concerning for a 3 cm  left breast mass.    2.  Splenomegaly with a number of normal-sized nodes throughout the  retroperitoneum and prominent nodes within the inguinal regions.    3.  Surgical absence of the gallbladder with slight prominence of the  intrahepatic portions of biliary tree.    This report is in agreement with the preliminary report.    ISAAC MARKHAM MD   Glucose by meter   Result Value Ref Range    Glucose 88 70 - 99 mg/dL   CBC with platelets differential   Result Value Ref Range    WBC 2.5 (L) 4.0 - 11.0 10e9/L    RBC Count 4.46 3.8 - 5.2 10e12/L    Hemoglobin 9.2 (L) 11.7 - 15.7  g/dL    Hematocrit 30.5 (L) 35.0 - 47.0 %    MCV 68 (L) 78 - 100 fl    MCH 20.6 (L) 26.5 - 33.0 pg    MCHC 30.2 (L) 31.5 - 36.5 g/dL    RDW 23.9 (H) 10.0 - 15.0 %    Platelet Count 94 (L) 150 - 450 10e9/L    Diff Method Automated Method     % Neutrophils 58.9 %    % Lymphocytes 29.8 %    % Monocytes 8.5 %    % Eosinophils 1.6 %    % Basophils 0.8 %    % Immature Granulocytes 0.4 %    Nucleated RBCs 0 0 /100    Absolute Neutrophil 1.5 (L) 1.6 - 8.3 10e9/L    Absolute Lymphocytes 0.7 (L) 0.8 - 5.3 10e9/L    Absolute Monocytes 0.2 0.0 - 1.3 10e9/L    Absolute Eosinophils 0.0 0.0 - 0.7 10e9/L    Absolute Basophils 0.0 0.0 - 0.2 10e9/L    Abs Immature Granulocytes 0.0 0 - 0.4 10e9/L    Absolute Nucleated RBC 0.0    Basic metabolic panel   Result Value Ref Range    Sodium 140 133 - 144 mmol/L    Potassium 3.9 3.4 - 5.3 mmol/L    Chloride 108 94 - 109 mmol/L    Carbon Dioxide 25 20 - 32 mmol/L    Anion Gap 7 3 - 14 mmol/L    Glucose 89 70 - 99 mg/dL    Urea Nitrogen 11 7 - 30 mg/dL    Creatinine 0.85 0.52 - 1.04 mg/dL    GFR Estimate 65 >60 mL/min/1.7m2    GFR Estimate If Black 79 >60 mL/min/1.7m2    Calcium 8.0 (L) 8.5 - 10.1 mg/dL       Studies:  CT Head w/o Contrast    (Results Pending)   CT Abdomen Pelvis w Contrast    (Results Pending)       Se Barahona   3/13/2018

## 2018-03-14 VITALS
DIASTOLIC BLOOD PRESSURE: 61 MMHG | HEART RATE: 71 BPM | BODY MASS INDEX: 27.48 KG/M2 | HEIGHT: 60 IN | OXYGEN SATURATION: 92 % | WEIGHT: 139.99 LBS | SYSTOLIC BLOOD PRESSURE: 145 MMHG | TEMPERATURE: 98.1 F | RESPIRATION RATE: 16 BRPM

## 2018-03-14 PROBLEM — D50.0 IRON DEFICIENCY ANEMIA DUE TO CHRONIC BLOOD LOSS: Status: ACTIVE | Noted: 2018-03-12

## 2018-03-14 PROBLEM — S01.01XA LACERATION OF SCALP, INITIAL ENCOUNTER: Status: ACTIVE | Noted: 2018-03-14
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
## 2018-03-14 LAB
ANION GAP SERPL CALCULATED.3IONS-SCNC: 6 MMOL/L (ref 3–14)
BACTERIA SPEC CULT: NORMAL
BASOPHILS # BLD AUTO: 0 10E9/L (ref 0–0.2)
BASOPHILS NFR BLD AUTO: 0.9 %
BUN SERPL-MCNC: 11 MG/DL (ref 7–30)
CALCIUM SERPL-MCNC: 8.1 MG/DL (ref 8.5–10.1)
CHLORIDE SERPL-SCNC: 112 MMOL/L (ref 94–109)
CO2 SERPL-SCNC: 25 MMOL/L (ref 20–32)
CREAT SERPL-MCNC: 0.88 MG/DL (ref 0.52–1.04)
DIFFERENTIAL METHOD BLD: ABNORMAL
EOSINOPHIL # BLD AUTO: 0.1 10E9/L (ref 0–0.7)
EOSINOPHIL NFR BLD AUTO: 2.8 %
ERYTHROCYTE [DISTWIDTH] IN BLOOD BY AUTOMATED COUNT: 23.9 % (ref 10–15)
GFR SERPL CREATININE-BSD FRML MDRD: 63 ML/MIN/1.7M2
GLUCOSE SERPL-MCNC: 90 MG/DL (ref 70–99)
HAPTOGLOB SERPL-MCNC: 62 MG/DL (ref 35–175)
HCT VFR BLD AUTO: 30.6 % (ref 35–47)
HGB BLD-MCNC: 9.1 G/DL (ref 11.7–15.7)
IMM GRANULOCYTES # BLD: 0 10E9/L (ref 0–0.4)
IMM GRANULOCYTES NFR BLD: 0 %
LYMPHOCYTES # BLD AUTO: 0.8 10E9/L (ref 0.8–5.3)
LYMPHOCYTES NFR BLD AUTO: 35.5 %
MCH RBC QN AUTO: 20.5 PG (ref 26.5–33)
MCHC RBC AUTO-ENTMCNC: 29.7 G/DL (ref 31.5–36.5)
MCV RBC AUTO: 69 FL (ref 78–100)
MONOCYTES # BLD AUTO: 0.2 10E9/L (ref 0–1.3)
MONOCYTES NFR BLD AUTO: 9 %
NEUTROPHILS # BLD AUTO: 1.1 10E9/L (ref 1.6–8.3)
NEUTROPHILS NFR BLD AUTO: 51.8 %
NRBC # BLD AUTO: 0 10*3/UL
NRBC BLD AUTO-RTO: 0 /100
PLATELET # BLD AUTO: 86 10E9/L (ref 150–450)
POTASSIUM SERPL-SCNC: 4.4 MMOL/L (ref 3.4–5.3)
RBC # BLD AUTO: 4.43 10E12/L (ref 3.8–5.2)
SODIUM SERPL-SCNC: 143 MMOL/L (ref 133–144)
SPECIMEN SOURCE: NORMAL
WBC # BLD AUTO: 2.1 10E9/L (ref 4–11)

## 2018-03-14 PROCEDURE — 25000132 ZZH RX MED GY IP 250 OP 250 PS 637: Mod: GY | Performed by: INTERNAL MEDICINE

## 2018-03-14 PROCEDURE — 99238 HOSP IP/OBS DSCHRG MGMT 30/<: CPT | Performed by: INTERNAL MEDICINE

## 2018-03-14 PROCEDURE — 85025 COMPLETE CBC W/AUTO DIFF WBC: CPT | Performed by: INTERNAL MEDICINE

## 2018-03-14 PROCEDURE — 80048 BASIC METABOLIC PNL TOTAL CA: CPT | Performed by: INTERNAL MEDICINE

## 2018-03-14 PROCEDURE — A9270 NON-COVERED ITEM OR SERVICE: HCPCS | Mod: GY | Performed by: INTERNAL MEDICINE

## 2018-03-14 PROCEDURE — 36415 COLL VENOUS BLD VENIPUNCTURE: CPT | Performed by: INTERNAL MEDICINE

## 2018-03-14 RX ORDER — FERROUS SULFATE 325(65) MG
325 TABLET ORAL 2 TIMES DAILY
Status: DISCONTINUED | OUTPATIENT
Start: 2018-03-14 | End: 2018-03-14 | Stop reason: HOSPADM

## 2018-03-14 RX ORDER — FERROUS SULFATE 325(65) MG
325 TABLET ORAL 2 TIMES DAILY
Qty: 100 TABLET | Refills: 0 | Status: SHIPPED | OUTPATIENT
Start: 2018-03-14

## 2018-03-14 RX ORDER — LEVOFLOXACIN 250 MG/1
250 TABLET, FILM COATED ORAL DAILY
Qty: 2 TABLET | Refills: 0 | Status: SHIPPED | OUTPATIENT
Start: 2018-03-14 | End: 2018-03-16

## 2018-03-14 RX ADMIN — POTASSIUM CHLORIDE 40 MEQ: 20 TABLET, EXTENDED RELEASE ORAL at 07:58

## 2018-03-14 RX ADMIN — OMEPRAZOLE 20 MG: 20 CAPSULE, DELAYED RELEASE ORAL at 05:48

## 2018-03-14 RX ADMIN — DOCUSATE SODIUM 100 MG: 100 CAPSULE, LIQUID FILLED ORAL at 07:57

## 2018-03-14 RX ADMIN — LEVOTHYROXINE SODIUM 50 MCG: 50 TABLET ORAL at 05:48

## 2018-03-14 RX ADMIN — HYDROCODONE BITARTRATE AND ACETAMINOPHEN 1 TABLET: 5; 325 TABLET ORAL at 05:48

## 2018-03-14 RX ADMIN — VENLAFAXINE HYDROCHLORIDE 75 MG: 75 CAPSULE, EXTENDED RELEASE ORAL at 07:57

## 2018-03-14 RX ADMIN — IRON 325 MG: 65 TABLET ORAL at 07:57

## 2018-03-14 ASSESSMENT — PAIN DESCRIPTION - DESCRIPTORS: DESCRIPTORS: DULL

## 2018-03-14 ASSESSMENT — VISUAL ACUITY: OU: BASELINE

## 2018-03-14 NOTE — PLAN OF CARE
Problem: Patient Care Overview  Goal: Plan of Care/Patient Progress Review  Outcome: Improving  Pt A&O x4, VSS.  C/o HA - back of head, pain managed with Norco x1 this shift at bedtime.  Red rash near IV, IV removed.  Pt refused to have new IV placed after 3 attempts.  MD switched pt to oral antbtx, gave 1 dose tonite. Urine cloudy, I/O monitored.  Pt up independently in room and stable on feet.  Call light in reach. Staple to back of head in place, no s/s of infection.     Face to face report given with opportunity to observe patient.    Report given to Jeanine Carballo   3/13/2018  11:50 PM

## 2018-03-14 NOTE — PLAN OF CARE
Patient discharged at 9:15 AM via ambulation accompanied by spouse and staff. Prescriptions sent to patients preferred pharmacy. All belongings sent with patient.     Discharge instructions reviewed with pt et spouse. Listed belongings gathered and returned to patient. yes    Patient discharged to home.   Report called to n/a    Core Measures and Vaccines  Core Measures applicable during stay: No. If yes, state diagnosis: n/a  Pneumonia and Influenza given prior to discharge, if indicated: N/A    Surgical Patient   Surgical Procedures during stay: n/a  Did patient receive discharge instruction on wound care and recognition of infection symptoms? Yes    MISC  Follow up appointment made:  Yes  Home and hospital aquired medications returned to patient: N/A  Patient reports pain was well managed at discharge: Yes

## 2018-03-14 NOTE — DISCHARGE SUMMARY
Admit Date:     03/12/2018   Discharge Date:           DISCHARGE DIAGNOSES:   1.  Status post fall secondary to syncope versus dizziness.   2.  Small head laceration.   3.  Iron deficiency anemia, chronic.   4.  Pancytopenia.   5.  Urinary tract infection.   6.  Diabetes mellitus type 2.      PROCEDURES:  None.      COMPLICATIONS:  None.      HOSPITAL COURSE:  The patient is a very pleasant 77-year-old female who presented to our ER after having a fall at home.  She had been feeling a little more stressed and not all that great for the last several days to longer, somewhat very vague symptoms.  At any rate, she was up on a stool arranging her knick-knacks, cleaning, and apparently she either lost her balance or became dizzy or she is not really sure what happened, but fell to the floor.  She may have had some loss of consciousness, maybe not, she does not quite remember.  At any rate, she woke up.  She had some blood there.  She subsequently came to the ER for evaluation.  The issues that were dealt with were as follows:   1.  Head laceration.  She had a small head laceration on her occiput that was stapled by Dr. Coppola in the ER.  The site looks fine.  She had a CT scan of her head which showed no abnormalities.  These staples will need to be removed in roughly the next week.  She can have these done when she sees Dr. Amaro as an outpatient followup visit.     2.  Fall, unclear etiology.  Her hemoglobin was low when she came in, 7, but hemodynamically stable.  She was monitored, had no evidence of any dysrhythmias at all.  Blood pressure stable, so this may have been just a loss of balance, maybe some transient hypotension; it is really unclear, but at any rate, we see nothing that seems to require further evaluation and workup at this time.  She was seen in consultation by Dr. Barahona, surgery, for a trauma consultation post-fall and could find no other abnormalities.   3.  Pancytopenia.  The patient presented  with lab values which showed a white count of 2600, hemoglobin of 7, platelet count 110,000, MCV was 63, RDW was 20.1, % reticulocyte count was 1.5%.  Repeat values later on after hospital stay that evening showed a hemoglobin of 6.5, white count 2200, platelet count of 96,000.  We have no real previous other CBCs; unfortunately, the last one was in 2013.  At that time her hemoglobin was normal.  Her platelet and white counts were normal at that time.  We have nothing since that time, so, for the last basically 5 years.  Her peripheral smear was unremarkable.  It does show findings consistent with iron deficiency.  Although her thrombocytopenia does not correlate with that; normally there is some thrombocytosis at times and deficiency.  So there is some concern that there are some underlying bone marrow issues ongoing here also.  A peripheral smear was otherwise not helpful.  Her iron deficiency was fixed via transfusion and iron replacement.  We would like to have her follow up with Oncology at discharge just for a followup visit regarding this issue, whether or not she requires bone marrow biopsy, etc.   4.  Iron deficiency anemia.  Close questioning of the patient, she declines any evidence of bleeding, no hematuria, no bright red blood per rectum, no black tarry stools, no coughing any blood, no throw up of any blood.  No evidence that she is aware of all of this.  Her appetite has been okay, denies any heartburn.  CT scan of her abdomen was done which was unremarkable.  She does have some splenomegaly noted.  Also does have a significant hiatal hernia.  Her stool was guaiac negative.  It appears that this is not an acute bleed at all, but more of a chronic anemia with iron deficiency.  Her last colonoscopy was back in 2011; she did have an adenomatous polyp at that time.  Has not had a followup.  Iron studies were performed, which did show that her ferritin was low at 5, iron was 14, TIBC was 592 for a 2% iron  saturation.  Her folic acid was normal.  TSH was normal.  B12 was normal at 322.  Liver function tests were normal.  So it certainly appears that this is iron deficiency.  We will start her on iron replacement.  She did get 3 units of packed cells and her hemoglobin has been stable at 9.1 today.  Her platelet count also remains stable on the lower side at 86,000, white count was 2100 with basically normal differential.  In terms of iron deficiency, she should undergo an endoscopy and colonoscopy.  We discussed with the patient, we will plan on this as an outpatient through Dr. Amaro's office.  Dr. Barahona, who saw her in consultation for the trauma consult, will be more than happy to perform those if desired, otherwise with the surgeon or GI specialist of Dr. Amaro and the patient's choice.  But I think this needs to be done just to make sure there is no source of bleeding at this time.   5.  Urinary tract infection.  The patient did have pyuria; maybe she has had some symptoms with this.  Unfortunately, culture was not done in the ER.  She did get 2 doses of IV Rocephin and will get 2 more doses of oral Levaquin.  In the past, she has had E. coli in her urine, which has been sensitive to everything.  She feels markedly better in terms of that aspect.   6.  Possible left breast mass.  As part of her CT scan, it did show a questionable mass in her left breast.  She has been very compliant with getting mammographies.  She is due this April for her yearly one.  On examination by multiple physicians, no mass can be palpated.  So we are not sure that this CT scan is finding anything of significance.  The patient is aware of this and should just have her routine mammography performed through Dr. Amaro's office; if there are questionable issues then, further workup should be done at that time.      So overall the patient came in with an episode of falling at home.  Probably she was somewhat symptomatic from her anemia  but this is not an acute bleed.  It is more of a chronic anemia with possible underlying pancytopenia issues, maybe with some myelodysplastic syndrome.  At any rate, she has been transfused.  Hemoglobin is stable.  She will be placed on iron supplementation.  She will need followup as an outpatient with both endoscopy and colonoscopy, followup CBCs,  and would like to have her see Hematology as an outpatient also, just to make sure that there is nothing else that needs to be done in terms of a marrow workup.      She does need the staples removed from her wound.  She will have this done at Dr. Amaro's office in the followup visit.        Otherwise, today she is very alert, oriented, anxious to go home.        Her other labs show her sodium is 143, potassium 4.4, chloride 112, CO2 is 25, BUN is 11, creatinine is 0.88.  Her calcium was 8.1, glucose is 90.  Her vital signs at discharge, sats are 96% on room air, temperature is 98.4, blood pressure is 160/80, heart rate 67 and regular.  Her weight that she had upon admission was 63.5 kilograms.      DISCHARGE MEDICATIONS:   1.  Ferrous sulfate 325 b.i.d.   2.  Levofloxacin 250 mg daily for 2 days.   3.  Hydrocodone APAP 5/325 one tablet every 6 hours p.r.n.   4.  Lasix 40 mg daily.   5.  Lorazepam for previous dosages; I do not have that available.   6.  Metformin 500 mg daily.   7.  Potassium chloride 10 mEq daily.   8.  Prilosec 20 mg b.i.d. before meals.   9.  Synthroid 50 mcg daily.   10.  Effexor XR 75 mg daily.   11.  Zocor 20 mg daily.        DISCHARGE INSTRUCTIONS:    I would like for her to stop her hydrochlorothiazide.  She will be on a regular diet.      ACTIVITY:  As tolerated.      She will follow up with Dr. Amaro in 7 days for a CBC and staple removal.  She also needs to be set up for endoscopy and colonoscopy as an outpatient.        Also needs followup for a mammography, which is scheduled in April.      Then she would like to see Dr. Lott,  hematologist, regarding her pancytopenia in the next couple of weeks.  Either in [place] or [place].      CODE STATUS:  She is a full code.         MARY THOMAS MD             D: 2018   T: 2018   MT: ADOLFO      Name:     MONCHO ORTIZ   MRN:      [id number]        Account:        [id number]   :      1940           Admit Date:     2018                                  Discharge Date:       Document: [id number]       cc: Jt Amaro MD

## 2018-03-14 NOTE — PLAN OF CARE
Problem: Patient Care Overview  Goal: Plan of Care/Patient Progress Review  Outcome: Improving    Vitals: VSS. RA at 95%. Afebrile. /55  Pulse 71  Temp 98.4  F (36.9  C) (Tympanic)  Resp 16  Ht 1.524 m (5')  Wt 63.5 kg (139 lb 15.9 oz)  SpO2 93%  BMI 27.34 kg/m2     Pain: Denied at beginning of shift. Towards end of shift c/o 3/10 pain to posterior head wound. Treated with norco.     Orientation: A&O.     Cardiac: Reg.      Lungs: Clear     ABD: Bowels Active.     Urinating: WNL. Adequate amount out.      Skin: Wound to back of head, staples and open to air. Scattered bruising.      IV fluids: No IV. Pt's daughter refused IV after too many attempts on evenings.      Antibiotics: PO Levaquin.      Mobility: Stand by assist. Steady on feet. Denies dizziness.      Safety: Bed alarm on. Call light in reach. Rounding done.      Comment: Slept well throughout night.    Face to face report given with opportunity to observe patient.    Report given to Katherin Loevtt   3/14/2018  7:06 AM

## 2018-03-14 NOTE — PLAN OF CARE
Problem: Patient Care Overview  Goal: Plan of Care/Patient Progress Review  Outcome: Adequate for Discharge Date Met: 03/14/18  Reason for hospital stay:  Fall with Laceration        Most recent vitals: /61  Pulse 71  Temp 98.1  F (36.7  C) (Tympanic)  Resp 16  Ht 1.524 m (5')  Wt 63.5 kg (139 lb 15.9 oz)  SpO2 92%  BMI 27.34 kg/m2    Pain Management:  Pain rated 2/10 after receiving lortab, declined further interventions    Orientation:  A/O    Cardiac:  WDL    Respiratory:  Lungs clear bilat    GI:  Bowel sounds audible et active x4    :  WDL    Skin Issues:  Laceration to back of head approximated well with staples, left open to air    IVF:  n/a    ABX:  Discharged with Levaquin    Mobility:  Independent    Safety:  A/O steady on feet        Comments:        3/14/2018  9:23 AM  Katherin Snow

## 2018-03-14 NOTE — PROGRESS NOTES
Name: Gina Beal    MRN#: [id number]    Reason for Hospitalization: Anemia [D64.9]  Fall, initial encounter [W19.XXXA]  Laceration of scalp, initial encounter [S01.01XA]    Discharge Date: 3/14/2018    Patient / Family response to discharge plan: Agrees    Follow-Up Appt: No future appointments.    Other Providers (Care Coordinator, [facility] Services, PCA services etc): No    Discharge Disposition: home  Family to transport    Ada Abramsfanta

## 2018-03-15 ENCOUNTER — TELEPHONE (OUTPATIENT)
Dept: CASE MANAGEMENT | Facility: HOSPITAL | Age: 78
End: 2018-03-15

## 2018-03-15 LAB
ABO + RH BLD: NORMAL
ABO + RH BLD: NORMAL
BLD GP AB SCN SERPL QL: NORMAL
BLD PROD TYP BPU: NORMAL
BLOOD BANK CMNT PATIENT-IMP: NORMAL
NUM BPU REQUESTED: 3
SPECIMEN EXP DATE BLD: NORMAL

## 2018-03-15 NOTE — DISCHARGE SUMMARY
Admit Date:     03/12/2018   Discharge Date:     03/14/2018      DATE OF ADMISSION: 03/12/2018   DISCHARGE DATE:  03/14/2018      DISCHARGE DIAGNOSES:   1.  Status post fall secondary to syncope versus dizziness.   2.  Small head laceration.   3.  Iron deficiency anemia, chronic.   4.  Pancytopenia.   5.  Urinary tract infection.   6.  Diabetes mellitus type 2.      PROCEDURES:  None.      COMPLICATIONS:  None.      HOSPITAL COURSE:  The patient is a very pleasant 77-year-old female who presented to our ER after having a fall at home.  She had been feeling a little more stressed and not all that great for the last several days to longer, somewhat very vague symptoms.  At any rate, she was up on a stool arranging her knick-knacks, cleaning, and apparently she either lost her balance or became dizzy or she is not really sure what happened, but fell to the floor.  She may have had some loss of consciousness, maybe not, she does not quite remember.  At any rate, she woke up.  She had some blood there.  She subsequently came to the ER for evaluation.  The issues that were dealt with were as follows:   1.  Head laceration.  She had a small head laceration on her occiput that was stapled by Dr. Coppola in the ER.  The site looks fine.  She had a CT scan of her head which showed no abnormalities.  These staples will need to be removed in roughly the next week.  She can have these done when she sees Dr. Amaro as an outpatient followup visit.     2.  Fall, unclear etiology.  Her hemoglobin was low when she came in, 7, but hemodynamically stable.  She was monitored, had no evidence of any dysrhythmias at all.  Blood pressure stable, so this may have been just a loss of balance, maybe some transient hypotension; it is really unclear, but at any rate, we see nothing that seems to require further evaluation and workup at this time.  She was seen in consultation by Dr. Barahona, surgery, for a trauma consultation post-fall and  could find no other abnormalities.   3.  Pancytopenia.  The patient presented with lab values which showed a white count of 2600, hemoglobin of 7, platelet count 110,000, MCV was 63, RDW was 20.1, % reticulocyte count was 1.5%.  Repeat values later on after hospital stay that evening showed a hemoglobin of 6.5, white count 2200, platelet count of 96,000.  We have no real previous other CBCs; unfortunately, the last one was in 2013.  At that time her hemoglobin was normal.  Her platelet and white counts were normal at that time.  We have nothing since that time, so, for the last basically 5 years.  Her peripheral smear was unremarkable.  It does show findings consistent with iron deficiency.  Although her thrombocytopenia does not correlate with that; normally there is some thrombocytosis at times and deficiency.  So there is some concern that there are some underlying bone marrow issues ongoing here also.  A peripheral smear was otherwise not helpful.  Her iron deficiency was fixed via transfusion and iron replacement.  We would like to have her follow up with Oncology at discharge just for a followup visit regarding this issue, whether or not she requires bone marrow biopsy, etc.   4.  Iron deficiency anemia.  Close questioning of the patient, she declines any evidence of bleeding, no hematuria, no bright red blood per rectum, no black tarry stools, no coughing any blood, no throw up of any blood.  No evidence that she is aware of all of this.  Her appetite has been okay, denies any heartburn.  CT scan of her abdomen was done which was unremarkable.  She does have some splenomegaly noted.  Also does have a significant hiatal hernia.  Her stool was guaiac negative.  It appears that this is not an acute bleed at all, but more of a chronic anemia with iron deficiency.  Her last colonoscopy was back in 2011; she did have an adenomatous polyp at that time.  Has not had a followup.  Iron studies were performed, which did  show that her ferritin was low at 5, iron was 14, TIBC was 592 for a 2% iron saturation.  Her folic acid was normal.  TSH was normal.  B12 was normal at 322.  Liver function tests were normal.  So it certainly appears that this is iron deficiency.  We will start her on iron replacement.  She did get 3 units of packed cells and her hemoglobin has been stable at 9.1 today.  Her platelet count also remains stable on the lower side at 86,000, white count was 2100 with basically normal differential.  In terms of iron deficiency, she should undergo an endoscopy and colonoscopy.  We discussed with the patient, we will plan on this as an outpatient through Dr. Amaro's office.  Dr. Barahona, who saw her in consultation for the trauma consult, will be more than happy to perform those if desired, otherwise with the surgeon or GI specialist of Dr. Amaro and the patient's choice.  But I think this needs to be done just to make sure there is no source of bleeding at this time.   5.  Urinary tract infection.  The patient did have pyuria; maybe she has had some symptoms with this.  Unfortunately, culture was not done in the ER.  She did get 2 doses of IV Rocephin and will get 2 more doses of oral Levaquin.  In the past, she has had E. coli in her urine, which has been sensitive to everything.  She feels markedly better in terms of that aspect.   6.  Possible left breast mass.  As part of her CT scan, it did show a questionable mass in her left breast.  She has been very compliant with getting mammographies.  She is due this April for her yearly one.  On examination by multiple physicians, no mass can be palpated.  So we are not sure that this CT scan is finding anything of significance.  The patient is aware of this and should just have her routine mammography performed through Dr. Amaro's office; if there are questionable issues then, further workup should be done at that time.      So overall the patient came in with an episode  of falling at home.  Probably she was somewhat symptomatic from her anemia but this is not an acute bleed.  It is more of a chronic anemia with possible underlying pancytopenia issues, maybe with some myelodysplastic syndrome.  At any rate, she has been transfused.  Hemoglobin is stable.  She will be placed on iron supplementation.  She will need followup as an outpatient with both endoscopy and colonoscopy, followup CBCs,  and would like to have her see Hematology as an outpatient also, just to make sure that there is nothing else that needs to be done in terms of a marrow workup.      She does need the staples removed from her wound.  She will have this done at Dr. Amaro's office in the followup visit.        Otherwise, today she is very alert, oriented, anxious to go home.        Her other labs show her sodium is 143, potassium 4.4, chloride 112, CO2 is 25, BUN is 11, creatinine is 0.88.  Her calcium was 8.1, glucose is 90.  Her vital signs at discharge, sats are 96% on room air, temperature is 98.4, blood pressure is 160/80, heart rate 67 and regular.  Her weight that she had upon admission was 63.5 kilograms.      DISCHARGE MEDICATIONS:   1.  Ferrous sulfate 325 b.i.d.   2.  Levofloxacin 250 mg daily for 2 days.   3.  Hydrocodone APAP 5/325 one tablet every 6 hours p.r.n.   4.  Lasix 40 mg daily.   5.  Lorazepam for previous dosages; I do not have that available.   6.  Metformin 500 mg daily.   7.  Potassium chloride 10 mEq daily.   8.  Prilosec 20 mg b.i.d. before meals.   9.  Synthroid 50 mcg daily.   10.  Effexor XR 75 mg daily.   11.  Zocor 20 mg daily.        DISCHARGE INSTRUCTIONS:    I would like for her to stop her hydrochlorothiazide.  She will be on a regular diet.      ACTIVITY:  As tolerated.      She will follow up with Dr. Amaro in 7 days for a CBC and staple removal.  She also needs to be set up for endoscopy and colonoscopy as an outpatient.        Also needs followup for a mammography, which is  scheduled in April.      Then she would like to see Dr. Lott, hematologist, regarding her pancytopenia in the next couple of weeks.  Either in [place] or [place].      CODE STATUS:  She is a full code.            MARY THOMAS MD             D: 2018   T: 2018   MT: NTS      Name:     MONCHO ORTIZ   MRN:      [id number]        Account:        [id number]   :      1940           Admit Date:     2018                                  Discharge Date: 2018      Document: [id number]       cc: Jt Amaro MD

## 2018-03-15 NOTE — TELEPHONE ENCOUNTER
Gina Beal, was discharged to home on 3/14/18   from [facility]. I spoke today with her regarding her  discharge.   She indicates she has receive(d) sufficient information upon discharge. Medications were reviewed in full on discharge, including: Medications to be started; medications to be stopped; medications to be continued from preadmission and any side effects. Prescriptions were received at discharge and were able to be filled. Medications are being taken as prescribed.   She indicates she has  an appointment scheduled for Mar 20, Mar 22 and has  transportation available. She was  able to confirm her  appointment time and has  it written down.   She did not have any questions regarding discharge instructions or condition.  Per their request, the following employee (s) can be recognized for their outstanding services delivered:  Care was excellent. No problems at all.  Suggestions to improve service: Nothing indicated.  She was informed she  may receive a survey in the mail from the hospital. Asked if she would kindly complete the survey in order for [facility] to know if services fully met patient needs.

## 2018-03-22 ENCOUNTER — TRANSFERRED RECORDS (OUTPATIENT)
Dept: HEALTH INFORMATION MANAGEMENT | Facility: CLINIC | Age: 78
End: 2018-03-22

## 2019-04-30 ENCOUNTER — HOSPITAL ENCOUNTER (EMERGENCY)
Facility: HOSPITAL | Age: 79
Discharge: HOME | End: 2019-04-30
Attending: PHYSICIAN ASSISTANT | Admitting: PHYSICIAN ASSISTANT
Payer: MEDICARE

## 2019-04-30 VITALS
OXYGEN SATURATION: 96 % | TEMPERATURE: 96.6 F | SYSTOLIC BLOOD PRESSURE: 148 MMHG | DIASTOLIC BLOOD PRESSURE: 79 MMHG | RESPIRATION RATE: 14 BRPM

## 2019-04-30 DIAGNOSIS — N39.0 URINARY TRACT INFECTION: ICD-10-CM

## 2019-04-30 LAB
ALBUMIN UR-MCNC: 30 MG/DL
APPEARANCE UR: ABNORMAL
BACTERIA #/AREA URNS HPF: ABNORMAL /HPF
BILIRUB UR QL STRIP: NEGATIVE
COLOR UR AUTO: YELLOW
GLUCOSE UR STRIP-MCNC: NEGATIVE MG/DL
HGB UR QL STRIP: ABNORMAL
KETONES UR STRIP-MCNC: NEGATIVE MG/DL
LEUKOCYTE ESTERASE UR QL STRIP: ABNORMAL
NITRATE UR QL: NEGATIVE
PH UR STRIP: 5.5 PH (ref 4.7–8)
RBC #/AREA URNS AUTO: 19 /HPF (ref 0–2)
SOURCE: ABNORMAL
SP GR UR STRIP: 1.02 (ref 1–1.03)
TRANS CELLS #/AREA URNS HPF: 2 /HPF (ref 0–1)
UROBILINOGEN UR STRIP-MCNC: NORMAL MG/DL (ref 0–2)
WBC #/AREA URNS AUTO: >182 /HPF (ref 0–5)

## 2019-04-30 PROCEDURE — 81001 URINALYSIS AUTO W/SCOPE: CPT | Performed by: PHYSICIAN ASSISTANT

## 2019-04-30 PROCEDURE — 87088 URINE BACTERIA CULTURE: CPT | Performed by: PHYSICIAN ASSISTANT

## 2019-04-30 PROCEDURE — 99203 OFFICE O/P NEW LOW 30 MIN: CPT | Performed by: PHYSICIAN ASSISTANT

## 2019-04-30 PROCEDURE — G0463 HOSPITAL OUTPT CLINIC VISIT: HCPCS

## 2019-04-30 PROCEDURE — 87186 SC STD MICRODIL/AGAR DIL: CPT | Performed by: PHYSICIAN ASSISTANT

## 2019-04-30 PROCEDURE — 87086 URINE CULTURE/COLONY COUNT: CPT | Performed by: PHYSICIAN ASSISTANT

## 2019-04-30 RX ORDER — CEFUROXIME AXETIL 250 MG/1
250 TABLET ORAL 2 TIMES DAILY
Qty: 14 TABLET | Refills: 0 | Status: SHIPPED | OUTPATIENT
Start: 2019-04-30 | End: 2019-05-07

## 2019-04-30 ASSESSMENT — ENCOUNTER SYMPTOMS
CHILLS: 0
FEVER: 0
FREQUENCY: 1
ABDOMINAL PAIN: 0
DYSURIA: 1

## 2019-04-30 NOTE — ED PROVIDER NOTES
History     Chief Complaint   Patient presents with     UTI     c/o uti symptoms     The history is provided by the patient.     Gina Beal is a 78 year old female who presented to the urgent care ambulatory along with granddaughter for evaluation of lower urinary tract symptoms consisting of dysuria, frequency, urgency.  Long history of chronic UTIs.  Recently finished Macrobid.  Denies any fevers or flank pain.  Denies any other concerns.    Allergies:  Allergies   Allergen Reactions     Aspirin      Flexeril [Cyclobenzaprine]      Iodine      Sulfa Drugs        Problem List:    Patient Active Problem List    Diagnosis Date Noted     Laceration of scalp, initial encounter 03/14/2018     Priority: Medium     Fall, initial encounter 03/14/2018     Priority: Medium     Iron deficiency anemia due to chronic blood loss 03/12/2018     Priority: Medium     Pancytopenia (H) 03/12/2018     Priority: Medium     Hypokalemia 03/12/2018     Priority: Medium     Syncope, unspecified syncope type 03/12/2018     Priority: Medium     Depression 03/12/2018     Priority: Medium     Osteoarthritis 03/12/2018     Priority: Medium     Symptomatic anemia 03/12/2018     Priority: Medium        Past Medical History:    No past medical history on file.    Past Surgical History:    No past surgical history on file.    Family History:    No family history on file.    Social History:  Marital Status:   [2]  Social History     Tobacco Use     Smoking status: Not on file   Substance Use Topics     Alcohol use: Not on file     Drug use: Not on file        Medications:      cefuroxime (CEFTIN) 250 MG tablet   ferrous sulfate (IRON) 325 (65 FE) MG tablet   Furosemide (LASIX PO)   HYDROcodone-acetaminophen (NORCO) 5-325 MG per tablet   Levothyroxine Sodium (SYNTHROID PO)   LORAZEPAM PO   METFORMIN HCL PO   Omeprazole (PRILOSEC PO)   potassium chloride sveta er (K-DUR)   Simvastatin (ZOCOR PO)   venlafaxine (EFFEXOR-XR) 75 MG 24 hr  capsule         Review of Systems   Constitutional: Negative for chills and fever.   Gastrointestinal: Negative for abdominal pain.   Genitourinary: Positive for dysuria, frequency and urgency.       Physical Exam   BP: 148/79  Heart Rate: 75  Temp: 96.6  F (35.9  C)  Resp: 14  SpO2: 96 %      Physical Exam   Constitutional: She is oriented to person, place, and time. She appears well-developed and well-nourished.   Pulmonary/Chest: Effort normal.   Neurological: She is alert and oriented to person, place, and time.   Skin: Skin is warm and dry. Capillary refill takes less than 2 seconds.   Psychiatric: She has a normal mood and affect.   Nursing note and vitals reviewed.      ED Course        Procedures               Critical Care time:  none               Results for orders placed or performed during the hospital encounter of 04/30/19 (from the past 24 hour(s))   UA reflex to Microscopic and Culture   Result Value Ref Range    Color Urine Yellow     Appearance Urine Slightly Cloudy     Glucose Urine Negative NEG^Negative mg/dL    Bilirubin Urine Negative NEG^Negative    Ketones Urine Negative NEG^Negative mg/dL    Specific Gravity Urine 1.016 1.003 - 1.035    Blood Urine Small (A) NEG^Negative    pH Urine 5.5 4.7 - 8.0 pH    Protein Albumin Urine 30 (A) NEG^Negative mg/dL    Urobilinogen mg/dL Normal 0.0 - 2.0 mg/dL    Nitrite Urine Negative NEG^Negative    Leukocyte Esterase Urine Large (A) NEG^Negative    Source Midstream Urine     RBC Urine 19 (H) 0 - 2 /HPF    WBC Urine >182 (H) 0 - 5 /HPF    Bacteria Urine Many (A) NEG^Negative /HPF    Transitional Epi 2 (H) 0 - 1 /HPF       Medications - No data to display    Assessments & Plan (with Medical Decision Making)   I cannot see any recent urine cultures from [facility].  We will culture this urine and have her start Ceftin.  Discussed red flag signs or symptoms.  She looks well today.  No tachycardia or fevers.  No flank pain.    This document was prepared  using a combination of typing and voice generated software.  While every attempt was made for accuracy, spelling and grammatical errors may exist.    I have reviewed the nursing notes.    I have reviewed the findings, diagnosis, plan and need for follow up with the patient.          Medication List      Started    cefuroxime 250 MG tablet  Commonly known as:  CEFTIN  250 mg, Oral, 2 TIMES DAILY            Final diagnoses:   Urinary tract infection       4/30/2019   [facility]     Gorge Jose PA-C  04/30/19 1529

## 2019-04-30 NOTE — ED AVS SNAPSHOT
[facility]  [address] [address]  Phone:  [phone]                                    Gina Beal   MRN: [id number]    Department:  [facility]   Date of Visit:  4/30/2019           After Visit Summary Signature Page    I have received my discharge instructions, and my questions have been answered. I have discussed any challenges I see with this plan with the nurse or doctor.    ..........................................................................................................................................  Patient/Patient Representative Signature      ..........................................................................................................................................  Patient Representative Print Name and Relationship to Patient    ..................................................               ................................................  Date                                   Time    ..........................................................................................................................................  Reviewed by Signature/Title    ...................................................              ..............................................  Date                                               Time          22EPIC Rev 08/18

## 2019-04-30 NOTE — DISCHARGE INSTRUCTIONS
Start Ceftin.    Stay hydrated.     Return here for any fevers, flank pain, or worsening symptoms.

## 2019-05-02 LAB
BACTERIA SPEC CULT: ABNORMAL
SPECIMEN SOURCE: ABNORMAL

## 2019-11-20 ENCOUNTER — HOSPITAL ENCOUNTER (OUTPATIENT)
Dept: CT IMAGING | Facility: HOSPITAL | Age: 79
Discharge: HOME | End: 2019-11-20
Admitting: FAMILY MEDICINE
Payer: MEDICARE

## 2019-11-20 DIAGNOSIS — R51.9 HEADACHE: ICD-10-CM

## 2019-11-20 DIAGNOSIS — R55 SYNCOPE AND COLLAPSE: ICD-10-CM

## 2019-11-20 DIAGNOSIS — R40.20 LOC (LOSS OF CONSCIOUSNESS) (H): ICD-10-CM

## 2019-11-20 PROCEDURE — 70450 CT HEAD/BRAIN W/O DYE: CPT | Mod: TC

## 2020-05-28 ENCOUNTER — APPOINTMENT (OUTPATIENT)
Dept: CT IMAGING | Facility: HOSPITAL | Age: 80
End: 2020-05-28
Attending: EMERGENCY MEDICINE
Payer: MEDICARE

## 2020-05-28 ENCOUNTER — TRANSFERRED RECORDS (OUTPATIENT)
Dept: HEALTH INFORMATION MANAGEMENT | Facility: CLINIC | Age: 80
End: 2020-05-28

## 2020-05-28 ENCOUNTER — APPOINTMENT (OUTPATIENT)
Dept: GENERAL RADIOLOGY | Facility: HOSPITAL | Age: 80
End: 2020-05-28
Attending: EMERGENCY MEDICINE
Payer: MEDICARE

## 2020-05-28 ENCOUNTER — HOSPITAL ENCOUNTER (EMERGENCY)
Facility: HOSPITAL | Age: 80
Discharge: ANOTHER HOSPITAL | End: 2020-05-28
Attending: EMERGENCY MEDICINE | Admitting: EMERGENCY MEDICINE
Payer: MEDICARE

## 2020-05-28 VITALS — OXYGEN SATURATION: 98 % | DIASTOLIC BLOOD PRESSURE: 91 MMHG | SYSTOLIC BLOOD PRESSURE: 150 MMHG | TEMPERATURE: 98.9 F

## 2020-05-28 DIAGNOSIS — R56.9 SEIZURES (H): Primary | ICD-10-CM

## 2020-05-28 LAB
ALBUMIN SERPL-MCNC: 3.3 G/DL (ref 3.4–5)
ALP SERPL-CCNC: 85 U/L (ref 40–150)
ALT SERPL W P-5'-P-CCNC: 34 U/L (ref 0–50)
ANION GAP SERPL CALCULATED.3IONS-SCNC: 9 MMOL/L (ref 3–14)
APTT PPP: 30 SEC (ref 22–37)
AST SERPL W P-5'-P-CCNC: 29 U/L (ref 0–45)
BASOPHILS # BLD AUTO: 0 10E9/L (ref 0–0.2)
BASOPHILS NFR BLD AUTO: 0.5 %
BILIRUB SERPL-MCNC: 0.6 MG/DL (ref 0.2–1.3)
BUN SERPL-MCNC: 13 MG/DL (ref 7–30)
CALCIUM SERPL-MCNC: 8.2 MG/DL (ref 8.5–10.1)
CHLORIDE SERPL-SCNC: 110 MMOL/L (ref 94–109)
CO2 SERPL-SCNC: 20 MMOL/L (ref 20–32)
CREAT SERPL-MCNC: 0.83 MG/DL (ref 0.52–1.04)
DIFFERENTIAL METHOD BLD: ABNORMAL
EOSINOPHIL # BLD AUTO: 0 10E9/L (ref 0–0.7)
EOSINOPHIL NFR BLD AUTO: 1.4 %
ERYTHROCYTE [DISTWIDTH] IN BLOOD BY AUTOMATED COUNT: 13.5 % (ref 10–15)
GFR SERPL CREATININE-BSD FRML MDRD: 68 ML/MIN/{1.73_M2}
GLUCOSE BLDC GLUCOMTR-MCNC: 136 MG/DL (ref 70–99)
GLUCOSE SERPL-MCNC: 147 MG/DL (ref 70–99)
HCT VFR BLD AUTO: 36.2 % (ref 35–47)
HGB BLD-MCNC: 11.6 G/DL (ref 11.7–15.7)
IMM GRANULOCYTES # BLD: 0 10E9/L (ref 0–0.4)
IMM GRANULOCYTES NFR BLD: 0.9 %
INR PPP: 1.29 (ref 0.86–1.14)
LYMPHOCYTES # BLD AUTO: 0.4 10E9/L (ref 0.8–5.3)
LYMPHOCYTES NFR BLD AUTO: 18 %
MCH RBC QN AUTO: 28.9 PG (ref 26.5–33)
MCHC RBC AUTO-ENTMCNC: 32 G/DL (ref 31.5–36.5)
MCV RBC AUTO: 90 FL (ref 78–100)
MONOCYTES # BLD AUTO: 0.1 10E9/L (ref 0–1.3)
MONOCYTES NFR BLD AUTO: 6.5 %
NEUTROPHILS # BLD AUTO: 1.6 10E9/L (ref 1.6–8.3)
NEUTROPHILS NFR BLD AUTO: 72.7 %
NRBC # BLD AUTO: 0 10*3/UL
NRBC BLD AUTO-RTO: 0 /100
PLATELET # BLD AUTO: 60 10E9/L (ref 150–450)
POTASSIUM SERPL-SCNC: 4.3 MMOL/L (ref 3.4–5.3)
PROT SERPL-MCNC: 6.4 G/DL (ref 6.8–8.8)
RBC # BLD AUTO: 4.01 10E12/L (ref 3.8–5.2)
SODIUM SERPL-SCNC: 139 MMOL/L (ref 133–144)
TROPONIN I SERPL-MCNC: <0.015 UG/L (ref 0–0.04)
TSH SERPL DL<=0.005 MIU/L-ACNC: 2.61 MU/L (ref 0.4–4)
WBC # BLD AUTO: 2.2 10E9/L (ref 4–11)

## 2020-05-28 PROCEDURE — 25000128 H RX IP 250 OP 636: Performed by: EMERGENCY MEDICINE

## 2020-05-28 PROCEDURE — 36415 COLL VENOUS BLD VENIPUNCTURE: CPT | Performed by: EMERGENCY MEDICINE

## 2020-05-28 PROCEDURE — 99285 EMERGENCY DEPT VISIT HI MDM: CPT | Mod: 25

## 2020-05-28 PROCEDURE — 40000276 ZZH STATISTIC RCP TIME ED VENT EA 10 MIN

## 2020-05-28 PROCEDURE — 93005 ELECTROCARDIOGRAM TRACING: CPT

## 2020-05-28 PROCEDURE — 40000986 XR CHEST 1 VW

## 2020-05-28 PROCEDURE — 80053 COMPREHEN METABOLIC PANEL: CPT | Performed by: EMERGENCY MEDICINE

## 2020-05-28 PROCEDURE — 85025 COMPLETE CBC W/AUTO DIFF WBC: CPT | Performed by: EMERGENCY MEDICINE

## 2020-05-28 PROCEDURE — 96375 TX/PRO/DX INJ NEW DRUG ADDON: CPT | Mod: XU

## 2020-05-28 PROCEDURE — 40000275 ZZH STATISTIC RCP TIME EA 10 MIN

## 2020-05-28 PROCEDURE — 87040 BLOOD CULTURE FOR BACTERIA: CPT | Performed by: EMERGENCY MEDICINE

## 2020-05-28 PROCEDURE — 96365 THER/PROPH/DIAG IV INF INIT: CPT

## 2020-05-28 PROCEDURE — 84443 ASSAY THYROID STIM HORMONE: CPT | Performed by: EMERGENCY MEDICINE

## 2020-05-28 PROCEDURE — 25800030 ZZH RX IP 258 OP 636: Performed by: EMERGENCY MEDICINE

## 2020-05-28 PROCEDURE — 94002 VENT MGMT INPAT INIT DAY: CPT

## 2020-05-28 PROCEDURE — 85610 PROTHROMBIN TIME: CPT

## 2020-05-28 PROCEDURE — 70450 CT HEAD/BRAIN W/O DYE: CPT | Mod: TC,XS

## 2020-05-28 PROCEDURE — 85730 THROMBOPLASTIN TIME PARTIAL: CPT | Performed by: EMERGENCY MEDICINE

## 2020-05-28 PROCEDURE — 70496 CT ANGIOGRAPHY HEAD: CPT | Mod: TC

## 2020-05-28 PROCEDURE — 84484 ASSAY OF TROPONIN QUANT: CPT | Performed by: EMERGENCY MEDICINE

## 2020-05-28 PROCEDURE — 00000146 ZZHCL STATISTIC GLUCOSE BY METER IP

## 2020-05-28 PROCEDURE — 99284 EMERGENCY DEPT VISIT MOD MDM: CPT | Mod: Z6 | Performed by: EMERGENCY MEDICINE

## 2020-05-28 PROCEDURE — 71260 CT THORAX DX C+: CPT | Mod: TC

## 2020-05-28 PROCEDURE — 25500064 ZZH RX 255 OP 636: Performed by: EMERGENCY MEDICINE

## 2020-05-28 PROCEDURE — 25000128 H RX IP 250 OP 636

## 2020-05-28 RX ORDER — DIPHENHYDRAMINE HYDROCHLORIDE 50 MG/ML
INJECTION INTRAMUSCULAR; INTRAVENOUS
Status: DISCONTINUED
Start: 2020-05-28 | End: 2020-05-28 | Stop reason: HOSPADM

## 2020-05-28 RX ORDER — LEVETIRACETAM 10 MG/ML
1000 INJECTION INTRAVASCULAR ONCE
Status: DISCONTINUED | OUTPATIENT
Start: 2020-05-28 | End: 2020-05-28 | Stop reason: HOSPADM

## 2020-05-28 RX ORDER — IOPAMIDOL 755 MG/ML
50 INJECTION, SOLUTION INTRAVASCULAR ONCE
Status: COMPLETED | OUTPATIENT
Start: 2020-05-28 | End: 2020-05-28

## 2020-05-28 RX ORDER — DIPHENHYDRAMINE HYDROCHLORIDE 50 MG/ML
50 INJECTION INTRAMUSCULAR; INTRAVENOUS ONCE
Status: COMPLETED | OUTPATIENT
Start: 2020-05-28 | End: 2020-05-28

## 2020-05-28 RX ORDER — CEFTRIAXONE SODIUM 1 G/50ML
1 INJECTION, SOLUTION INTRAVENOUS ONCE
Status: DISCONTINUED | OUTPATIENT
Start: 2020-05-28 | End: 2020-05-28 | Stop reason: HOSPADM

## 2020-05-28 RX ORDER — PROPOFOL 10 MG/ML
INJECTION, EMULSION INTRAVENOUS
Status: COMPLETED
Start: 2020-05-28 | End: 2020-05-28

## 2020-05-28 RX ORDER — IOPAMIDOL 612 MG/ML
50 INJECTION, SOLUTION INTRAVASCULAR ONCE
Status: COMPLETED | OUTPATIENT
Start: 2020-05-28 | End: 2020-05-28

## 2020-05-28 RX ORDER — LEVETIRACETAM 500 MG/1
1000 TABLET ORAL 2 TIMES DAILY
Status: DISCONTINUED | OUTPATIENT
Start: 2020-05-28 | End: 2020-05-28 | Stop reason: HOSPADM

## 2020-05-28 RX ORDER — MIDAZOLAM HYDROCHLORIDE 5 MG/ML
INJECTION, SOLUTION INTRAMUSCULAR; INTRAVENOUS
Status: DISCONTINUED
Start: 2020-05-28 | End: 2020-05-28 | Stop reason: HOSPADM

## 2020-05-28 RX ORDER — PROPOFOL 10 MG/ML
5-75 INJECTION, EMULSION INTRAVENOUS CONTINUOUS
Status: DISCONTINUED | OUTPATIENT
Start: 2020-05-28 | End: 2020-05-28 | Stop reason: CLARIF

## 2020-05-28 RX ADMIN — HYDROCORTISONE SODIUM SUCCINATE 200 MG: 100 INJECTION, POWDER, FOR SOLUTION INTRAMUSCULAR; INTRAVENOUS at 10:20

## 2020-05-28 RX ADMIN — PROPOFOL 20 MCG/KG/MIN: 10 INJECTION, EMULSION INTRAVENOUS at 10:37

## 2020-05-28 RX ADMIN — IOPAMIDOL 50 ML: 612 INJECTION, SOLUTION INTRAVENOUS at 10:31

## 2020-05-28 RX ADMIN — IOPAMIDOL 50 ML: 755 INJECTION, SOLUTION INTRAVENOUS at 10:31

## 2020-05-28 RX ADMIN — SODIUM CHLORIDE 500 ML: 9 INJECTION, SOLUTION INTRAVENOUS at 10:36

## 2020-05-28 RX ADMIN — DIPHENHYDRAMINE HYDROCHLORIDE 50 MG: 50 INJECTION, SOLUTION INTRAMUSCULAR; INTRAVENOUS at 10:20

## 2020-05-28 NOTE — ED NOTES
[facility] transporting patient to [facility]. Remains intubated/sedated on monitors. Belongings with patient.  at bedside and aware of plan of care.

## 2020-05-28 NOTE — ED NOTES
Patient arrives via [facility] along with [facility] for evaluation of altered mental status.  reporting patient was at baseline last night when going to bed. Upon awakening this morning, patient had reported altered mental status. EMS called to the residence. Patient alert and talking, walked to ambulance cot. Patient then had a grand mal seizure, intercept was called. Intubated en-route. Ketamine and Succs given. Patient then had a second seizure for EMS. On arrival, Dr. Barrow assessed patient in the hallway then to CT. Monitors placed on patient. History of HTN, diabetes, depression, and colon cancer. This writer, Ev RN and RT accompanied patient to CT.

## 2020-05-28 NOTE — ED PROVIDER NOTES
History     Chief Complaint   Patient presents with     Cerebrovascular Accident     HPI  Gina Beal is a 79 year old female who presents to the emergency department with altered mental status.  Patient was last known well yesterday evening per the .  Woke up this morning and the spouse noted that she was confused: Not putting on her clothes and not responding to questions appropriately.  They called 911 and when the EMS team arrived the patient was confused and was not answering questions appropriately.  She walked into the ambulance and then collapsed.  She had a grand mal seizure that lasted for approximately 45 seconds.  She had a second seizure that lasted approximately a minute upon which she was intubated and brought to the emergency department.  Patient is known to have colon cancer in remission for the last 2 years, hypothyroidism on treatment and is also diabetic.  No further history available.  All the above history was given by the EMS team.    Allergies:  Allergies   Allergen Reactions     Aspirin      Flexeril [Cyclobenzaprine]      Iodine      Sulfa Drugs        Problem List:    Patient Active Problem List    Diagnosis Date Noted     Laceration of scalp, initial encounter 03/14/2018     Priority: Medium     Fall, initial encounter 03/14/2018     Priority: Medium     Iron deficiency anemia due to chronic blood loss 03/12/2018     Priority: Medium     Pancytopenia (H) 03/12/2018     Priority: Medium     Hypokalemia 03/12/2018     Priority: Medium     Syncope, unspecified syncope type 03/12/2018     Priority: Medium     Depression 03/12/2018     Priority: Medium     Osteoarthritis 03/12/2018     Priority: Medium     Symptomatic anemia 03/12/2018     Priority: Medium        Past Medical History:    No past medical history on file.    Past Surgical History:    No past surgical history on file.    Family History:    No family history on file.    Social History:  Marital Status:    [2]  Social History     Tobacco Use     Smoking status: Not on file   Substance Use Topics     Alcohol use: Not on file     Drug use: Not on file        Medications:    ferrous sulfate (IRON) 325 (65 FE) MG tablet  Furosemide (LASIX PO)  HYDROcodone-acetaminophen (NORCO) 5-325 MG per tablet  Levothyroxine Sodium (SYNTHROID PO)  LORAZEPAM PO  METFORMIN HCL PO  Omeprazole (PRILOSEC PO)  potassium chloride sveta er (K-DUR)  Simvastatin (ZOCOR PO)  venlafaxine (EFFEXOR-XR) 75 MG 24 hr capsule          Review of Systems   Unable to perform ROS: Intubated       Physical Exam   BP: 150/91  Heart Rate: 69  Temp: 98.9  F (37.2  C)  SpO2: 95 %      Physical Exam  Vitals signs and nursing note reviewed.   Constitutional:       General: She is not in acute distress.     Appearance: She is not diaphoretic.   HENT:      Head: Normocephalic and atraumatic.   Eyes:      General: No scleral icterus.     Pupils: Pupils are equal, round, and reactive to light.      Comments: 3 mm bilaterally   Cardiovascular:      Rate and Rhythm: Normal rate and regular rhythm.      Pulses: Normal pulses.      Heart sounds: Normal heart sounds.   Pulmonary:      Breath sounds: Normal breath sounds.      Comments: Intubated and ventilated  Abdominal:      General: Bowel sounds are normal.      Palpations: Abdomen is soft.   Skin:     General: Skin is warm.         ED Course   Patient evaluated in the hospital and transferred to CT scan.  10:20 AM-the patient spouse came to the ED and further corroborated the above history from EMS team.  10:30 AM -patient brought back from CT scan and complete entire exam done in the patient is intubated and sedated.  10:30 AM -propofol infusion started.     Procedures       EKG Interpretation:      Interpreted by Ruy Barrow MD  Time reviewed: 10:38 AM  Symptoms at time of EKG: Altered mental status  Rhythm: normal sinus   Rate: normal  Axis: normal  Ectopy: none  Conduction: normal  ST Segments/ T Waves: No  ST-T wave changes  Q Waves: none  Comparison to prior: No old EKG available    Clinical Impression: Normal sinus rhythm        Results for orders placed or performed during the hospital encounter of 05/28/20 (from the past 24 hour(s))   CT Head w/o Contrast    Narrative    PROCEDURE: CT HEAD W/O CONTRAST     HISTORY: Altered level of consciousness (LOC), unexplained.    COMPARISON: November 20, 2019    TECHNIQUE:  Helical images of the head from the foramen magnum to the  vertex were obtained without contrast.    FINDINGS: The ventricles and sulci are normal in volume. No acute  intracranial hemorrhage, mass effect, midline shift, hydrocephalus or  basilar cystern effacement are present.    The grey-white matter interface is preserved. There is white matter  low density in both hemispheres consistent with small vessel changes    The calvarium is intact. The mastoid air cells are clear.  The  visualized paranasal sinuses are clear.      Impression    IMPRESSION: No acute brain abnormality.      BARRY RYAN MD   Glucose by meter   Result Value Ref Range    Glucose 136 (H) 70 - 99 mg/dL   CBC with platelets differential   Result Value Ref Range    WBC 2.2 (L) 4.0 - 11.0 10e9/L    RBC Count 4.01 3.8 - 5.2 10e12/L    Hemoglobin 11.6 (L) 11.7 - 15.7 g/dL    Hematocrit 36.2 35.0 - 47.0 %    MCV 90 78 - 100 fl    MCH 28.9 26.5 - 33.0 pg    MCHC 32.0 31.5 - 36.5 g/dL    RDW 13.5 10.0 - 15.0 %    Platelet Count 60 (L) 150 - 450 10e9/L    Diff Method Automated Method     % Neutrophils 72.7 %    % Lymphocytes 18.0 %    % Monocytes 6.5 %    % Eosinophils 1.4 %    % Basophils 0.5 %    % Immature Granulocytes 0.9 %    Nucleated RBCs 0 0 /100    Absolute Neutrophil 1.6 1.6 - 8.3 10e9/L    Absolute Lymphocytes 0.4 (L) 0.8 - 5.3 10e9/L    Absolute Monocytes 0.1 0.0 - 1.3 10e9/L    Absolute Eosinophils 0.0 0.0 - 0.7 10e9/L    Absolute Basophils 0.0 0.0 - 0.2 10e9/L    Abs Immature Granulocytes 0.0 0 - 0.4 10e9/L    Absolute  Nucleated RBC 0.0    Comprehensive metabolic panel   Result Value Ref Range    Sodium 139 133 - 144 mmol/L    Potassium 4.3 3.4 - 5.3 mmol/L    Chloride 110 (H) 94 - 109 mmol/L    Carbon Dioxide 20 20 - 32 mmol/L    Anion Gap 9 3 - 14 mmol/L    Glucose 147 (H) 70 - 99 mg/dL    Urea Nitrogen 13 7 - 30 mg/dL    Creatinine 0.83 0.52 - 1.04 mg/dL    GFR Estimate 68 >60 mL/min/[1.73_m2]    GFR Estimate If Black 78 >60 mL/min/[1.73_m2]    Calcium 8.2 (L) 8.5 - 10.1 mg/dL    Bilirubin Total 0.6 0.2 - 1.3 mg/dL    Albumin 3.3 (L) 3.4 - 5.0 g/dL    Protein Total 6.4 (L) 6.8 - 8.8 g/dL    Alkaline Phosphatase 85 40 - 150 U/L    ALT 34 0 - 50 U/L    AST 29 0 - 45 U/L   INR   Result Value Ref Range    INR 1.29 (H) 0.86 - 1.14   Partial thromboplastin time   Result Value Ref Range    PTT 30 22 - 37 sec   Troponin I   Result Value Ref Range    Troponin I ES <0.015 0.000 - 0.045 ug/L   TSH with free T4 reflex   Result Value Ref Range    TSH 2.61 0.40 - 4.00 mU/L   CTA Head Neck with Contrast    Narrative    PROCEDURE: CTA  HEAD NECK WITH CONTRAST 5/28/2020 10:58 AM    HISTORY: Neuro deficit(s), subacute; Evaluate for  dissection/thromboembolism    COMPARISONS: None.    Meds/Dose Given: [id number] 50mL    TECHNIQUE: CT angiogram of the neck and CT angiogram of the brain with  three-dimensional MIPS reconstructions performed on a separate  workstation    FINDINGS: CT angiogram of the neck: The brachiocephalic artery is  widely patent. The right common carotid artery is widely patent. At  the right carotid bifurcation there is heavy atherosclerotic plaquing  with less than 50% stenosis noted. Beyond the bifurcation the right  internal carotid artery is widely patent to the skull base. The left  common carotid artery is tortuous proximally there is calcific  plaquing at the left carotid bifurcation with less than 50% stenosis  noted. The on the bifurcation the internal carotid artery is widely  patent to the skull base. There is  atherosclerotic plaquing in the  left subclavian artery and at the origin of the left vertebral artery  with less than 50% stenosis noted. The right subclavian artery and  right vertebral arteries are both widely patent.    CT angiogram of the brain: The distal vertebral basilar posterior  cerebral arteries are widely patent. There is calcific plaquing of the  carotid siphons. The supraclinoid internal carotid arteries anterior  and middle cerebral arteries are widely patent. No intracranial  stenoses or aneurysms or vascular shifts are noted.    The ventricular system is normal in size for age. There is white  matter low density in both hemispheres consistent with small vessel  disease. There is a small polyp or retention cyst seen on the floor of  the right maxillary sinus. The muscles of mastication and the  parapharyngeal spaces are normal. Salivary glands are normal. There is  an endotracheal tube in place. The patient has been intubated in the  right mainstem bronchus. Severe emphysematous changes are noted in the  lungs with some confluent opacities in the left upper lobe consistent  with atelectasis or pneumonia.         Impression    IMPRESSION: No hemodynamic significant stenoses are identified in the  vessels of the neck or brain. No vascular occlusions are noted.    The patient has been intubated in the right mainstem bronchus and the  endotracheal tube should be withdrawn partially    BARRY RYAN MD   CT Chest/Abdomen/Pelvis w Contrast    Narrative    PROCEDURE: CT CHEST/ABDOMEN/PELVIS W CONTRAST 5/28/2020 10:58 AM    HISTORY: Syncope and collapse, unknown etiology    COMPARISONS: None.    Meds/Dose Given: Isovue 300 50mL    TECHNIQUE: CT scan of the chest abdomen and pelvis with IV contrast  sagittal coronal reconstructions were obtained.    FINDINGS: CT chest There is significant volume loss in the left lung  associated pneumonia cannot be excluded. There is mild dependent  atelectasis seen in  the right lung. Emphysematous changes are noted  bilaterally. The heart is enlarged with heavy coronary artery  calcifications.. There are no hilar or mediastinal masses or  lymphadenopathy. There is a large hiatal hernia. There is an  intratracheal tube with its tip in the right mainstem bronchus. The  tube should be withdrawn 3 to 4 cm.    The liver is free of masses. The gallbladder is been removed. The bile  ducts are normal for a postcholecystectomy patient. The spleen is  enlarged. The pancreas appears normal. There are no adrenal masses.  There are low-density cysts seen in both kidneys. The periaortic lymph  nodes are normal in caliber. No intraperitoneal masses or inflammatory  changes are noted. Postoperative changes are seen in the colon. In the  pelvis the bladder and rectum are normal. Degenerative changes are  present at L4-L5. There is spondylolisthesis of L5 on S1         Impression    IMPRESSION: Extensive left lung volume loss. Associated pneumonia  cannot be excluded. The patient has been intubated in the right  mainstem bronchus and the endotracheal tube should be withdrawn 3 to 4  cm.    Cardiomegaly with heavy coronary artery calcifications    splenomegaly unchanged from 2018    BARRY RYAN MD   XR Chest 1 View    Narrative    PROCEDURE:  XR CHEST 1 VW    HISTORY:  tube placement.     COMPARISON:  None.    FINDINGS:   The heart is mildly enlarged The pulmonary vasculature is normal.   There is abnormal increase in density in the left lung consistent with  atelectasis and/or pneumonia. No pleural effusion or pneumothorax.  There is an endotracheal tube in the right mainstem bronchus the tube  should be withdrawn approximately 4 cm.      Impression    IMPRESSION:  Intubation of the right mainstem bronchus. Left lung  volume loss. Associated pneumonia cannot be excluded      BARRY RYAN MD       Medications   midazolam 5 mg/mL (VERSED) intranasal solution 5 mg (has no administration in  time range)   levETIRAcetam (KEPPRA) tablet 1,000 mg (1,000 mg Oral Not Given 5/28/20 1147)   levETIRAcetam (KEPPRA) intermittent infusion 1,000 mg (1,000 mg Intravenous Not Given 5/28/20 1147)   cefTRIAXone in d5w (ROCEPHIN) intermittent infusion 1 g (1 g Intravenous Not Given 5/28/20 1146)   0.9% sodium chloride BOLUS (0 mLs Intravenous Stopped 5/28/20 1143)   hydrocortisone sodium succinate PF (solu-CORTEF) injection 200 mg (200 mg Intravenous Given 5/28/20 1020)   diphenhydrAMINE (BENADRYL) injection 50 mg (50 mg Intravenous Given 5/28/20 1020)   iopamidol (ISOVUE-300) IV solution 61% 50 mL (50 mLs Intravenous Given 5/28/20 1031)   iopamidol (ISOVUE-370) solution 50 mL (50 mLs Intravenous Given 5/28/20 1031)   sodium chloride (PF) 0.9% PF flush 100 mL (100 mLs Intravenous Given 5/28/20 1031)       Assessments & Plan (with Medical Decision Making)   Seizure activity: Presented to the ED intubated and ventilated after EMS was called because patient had altered mental status and also had 2 episodes of tonic-clonic seizures in the ambulance.  Originally suspected to have a stroke but limited neurological evaluation in an intubated patient and the negative CT scans ruled out this.  Patient however still requires MRI to be done and further evaluation of the new onset seizure.  Patient was loaded with Keppra at thousand milligrams and sedated with propofol.  Transferred to [facility] under care of Dr. Joshi for further evaluation of the new onset seizure activity.  Chronic medical conditions include hypothyroidism on treatment, diabetes mellitus and treatment and history of completely resected colon cancer 2 years ago.  Transferred to [facility] via helicopter who was launched by the EMS team prior to patient arrival at the ED.    I have reviewed the nursing notes.    I have reviewed the findings, diagnosis, plan and need for follow up with the patient.  NIH scale-not done because patient is  intubated and ventilated.    New Prescriptions    No medications on file       Final diagnoses:   Seizures (H)       5/28/2020   [facility]     Ruy Barrow MD  05/28/20 0102       Ruy Barrow MD  05/28/20 1550

## 2020-05-28 NOTE — PROGRESS NOTES
Patient arrived to ER intubated with size 7.0 ETT.  Meet patient in ambulance garage, placed on transport vent with HEPA filter (settings AC 12, Vt 400, Peep 5, Fio2 100%) and brought to CT scanner.  Ett was secured at lip at 21cm according to EMS.  ETT found to be at 25cm, orders to pull back ett.  ETT re-secured at 21cm at lip.  Xray to confirm. Patient transported without issues and remained on vent until transferred to helicopter.

## 2020-05-28 NOTE — ED NOTES
Pt to CT from EMS cart. Hepa filter applied to ED tube in garage prior to entering ED. Dr. Barrow assessed pt in hallway prior to going to CT.

## 2020-05-28 NOTE — ED NOTES
Patient on [facility] monitor once back to room from CT. Vital sign print out sent to medical records.

## 2020-05-31 NOTE — ED NOTES
[facility] requesting doctor's notes be faxed to [facility]'s ICU. The nurse I spoke with said they only received the ambulance report.